# Patient Record
Sex: MALE | Race: WHITE | Employment: FULL TIME | URBAN - METROPOLITAN AREA
[De-identification: names, ages, dates, MRNs, and addresses within clinical notes are randomized per-mention and may not be internally consistent; named-entity substitution may affect disease eponyms.]

---

## 2018-12-25 ENCOUNTER — HOSPITAL ENCOUNTER (EMERGENCY)
Age: 43
Discharge: HOME | End: 2018-12-25
Attending: EMERGENCY MEDICINE
Payer: COMMERCIAL

## 2018-12-25 ENCOUNTER — APPOINTMENT (OUTPATIENT)
Dept: GENERAL RADIOLOGY | Age: 43
End: 2018-12-25
Payer: COMMERCIAL

## 2018-12-25 VITALS
DIASTOLIC BLOOD PRESSURE: 104 MMHG | WEIGHT: 195 LBS | OXYGEN SATURATION: 97 % | SYSTOLIC BLOOD PRESSURE: 142 MMHG | RESPIRATION RATE: 16 BRPM | BODY MASS INDEX: 31.34 KG/M2 | TEMPERATURE: 98.6 F | HEART RATE: 93 BPM | HEIGHT: 66 IN

## 2018-12-25 DIAGNOSIS — S61.210A LACERATION OF RIGHT INDEX FINGER WITHOUT FOREIGN BODY WITHOUT DAMAGE TO NAIL, INITIAL ENCOUNTER: Primary | ICD-10-CM

## 2018-12-25 PROCEDURE — 99283 EMERGENCY DEPT VISIT LOW MDM: CPT

## 2018-12-25 PROCEDURE — 73140 X-RAY EXAM OF FINGER(S): CPT

## 2018-12-25 PROCEDURE — 4500000023 HC ED LEVEL 3 PROCEDURE

## 2018-12-25 ASSESSMENT — PAIN SCALES - GENERAL: PAINLEVEL_OUTOF10: 1

## 2018-12-25 ASSESSMENT — PAIN DESCRIPTION - PAIN TYPE: TYPE: ACUTE PAIN

## 2018-12-25 ASSESSMENT — PAIN DESCRIPTION - LOCATION: LOCATION: FINGER (COMMENT WHICH ONE)

## 2018-12-25 ASSESSMENT — PAIN DESCRIPTION - ORIENTATION: ORIENTATION: RIGHT

## 2019-04-15 ENCOUNTER — OFFICE VISIT (OUTPATIENT)
Dept: CARDIOLOGY CLINIC | Age: 44
End: 2019-04-15
Payer: COMMERCIAL

## 2019-04-15 VITALS
OXYGEN SATURATION: 98 % | SYSTOLIC BLOOD PRESSURE: 122 MMHG | BODY MASS INDEX: 33.43 KG/M2 | HEIGHT: 66 IN | DIASTOLIC BLOOD PRESSURE: 86 MMHG | HEART RATE: 79 BPM | WEIGHT: 208 LBS

## 2019-04-15 DIAGNOSIS — R94.31 ABNORMAL EKG: Primary | ICD-10-CM

## 2019-04-15 DIAGNOSIS — R07.2 PRECORDIAL PAIN: ICD-10-CM

## 2019-04-15 PROCEDURE — 99244 OFF/OP CNSLTJ NEW/EST MOD 40: CPT | Performed by: INTERNAL MEDICINE

## 2019-04-15 PROCEDURE — 93000 ELECTROCARDIOGRAM COMPLETE: CPT | Performed by: INTERNAL MEDICINE

## 2019-04-15 NOTE — PROGRESS NOTES
[facility]   Cardiac Consultation    Referring Provider:  Anny Mantilla MD     Chief Complaint   Patient presents with    New Patient    Other     Abnormal EKG, no symptoms. History of Present Illness:  NEW patient for interventional cardiology, referred by PCP for abnormal EKG. Today he states he has chest pain at times that started 2 years ago. His pain is sometimes resolved when he takes tums. He last had this pain 2 weeks ago. Chest pain does not radiate and he has no associated SOB. Unchanged past 2 years. Patient currently denies any weight gain, edema, palpitations, shortness of breath, dizziness, and syncope. Past Medical History:   has no past medical history on file. Surgical History:   has a past surgical history that includes Shoulder arthroscopy (Bilateral). Social History:   reports that he has never smoked. His smokeless tobacco use includes chew. He reports that he does not drink alcohol or use drugs. Family History:  family history is not on file. No family HX of heart disease     Home Medications:  Prior to Admission medications    Not on File        Allergies:  Patient has no known allergies. Review of Systems:   · Constitutional: there has been no unanticipated weight loss. There's been no change in energy level, sleep pattern, or activity level. · Eyes: No visual changes or diplopia. No scleral icterus. · ENT: No Headaches, hearing loss or vertigo. No mouth sores or sore throat. · Cardiovascular: Reviewed in HPI  · Respiratory: No cough or wheezing, no sputum production. No hematemesis. · Gastrointestinal: No abdominal pain, appetite loss, blood in stools. No change in bowel or bladder habits. · Genitourinary: No dysuria, trouble voiding, or hematuria. · Musculoskeletal:  No gait disturbance, weakness or joint complaints. · Integumentary: No rash or pruritis.   · Neurological: No headache, diplopia, change in muscle strength, numbness or tingling. No change in gait, balance, coordination, mood, affect, memory, mentation, behavior. · Psychiatric: No anxiety, no depression. · Endocrine: No malaise, fatigue or temperature intolerance. No excessive thirst, fluid intake, or urination. No tremor. · Hematologic/Lymphatic: No abnormal bruising or bleeding, blood clots or swollen lymph nodes. · Allergic/Immunologic: No nasal congestion or hives. Physical Examination:    Vitals:    04/15/19 1512   BP: 122/86   Pulse: 79   SpO2: 98%        Constitutional and General Appearance: NAD   Respiratory:  · Normal excursion and expansion without use of accessory muscles  · Resp Auscultation: Normal breath sounds without dullness  Cardiovascular:  · The apical impulses not displaced  · Heart tones are crisp and normal  · Cervical veins are not engorged  · The carotid upstroke is normal in amplitude and contour without delay or bruit  · Normal S1S2, No S3, No Murmur  · Peripheral pulses are symmetrical and full  · There is no clubbing, cyanosis of the extremities. · No edema  · Femoral Arteries: 2+ and equal  · Pedal Pulses: 2+ and equal   Abdomen:  · No masses or tenderness  · Liver/Spleen: No Abnormalities Noted  Neurological/Psychiatric:  · Alert and oriented in all spheres  · Moves all extremities well  · Exhibits normal gait balance and coordination  · No abnormalities of mood, affect, memory, mentation, or behavior are noted    Stress test 2016  Interpetation Summary: 1. Negative ECG for ischemia with graded exercise test.   2.Govea Treadmill Score is 9 which indicates low risk. Assessment:   Abnormal EKG - ST depression. possible old IMI   Chest pain - atypical    Plan:  Stress test- Clarissa Myoview  Echocardiogram   Continue current medications   Check blood pressure at home weekly  Regular exercise and following a healthy diet encouraged   Follow up with me in 2 weeks     Scribe's attestation:   This note was scribed in the presence of Dr. Eulogio Lu Brook FUCHS By Anahy Zamudio RN    The scribes docuementation has been prepared under my direction and personally reviewed by me in its entirety. I confirm that the note above accurately reflects all work, treatment, procedures, and medical decision making performed by me. Dr. Beka Jett MD        Thank you for allowing me to participate in the care of this individual.      Darian Conroy.  Chinmay Bowens M.D., Marino Quintanilla

## 2019-04-15 NOTE — PATIENT INSTRUCTIONS
Plan:  Stress test- Clarissa Myoernesto  Echocardiogram   Continue current medications   Check blood pressure at home weekly  Regular exercise and following a healthy diet encouraged   Follow up with me in 2 weeks

## 2019-04-15 NOTE — LETTER
[address] Cardiology - [address] [place]  Phone: [phone]  Fax: [phone]    Ping President, MD        April 23, 2019     Sophia Albarran, [address] [facility] [place]    Patient: Juliette Viramontes  MR Number: [id number]  YOB: 1975  Date of Visit: 4/15/2019    Dear Dr. Cortes Files:    Today I saw our mutual patient named above. Below are the relevant portions of my assessment and plan of care. If you have questions, please do not hesitate to call me. I look forward to following [facility] along with you. [address]   Cardiac Consultation    Referring Provider:  Sophia Albarran MD     Chief Complaint   Patient presents with    New Patient    Other     Abnormal EKG, no symptoms. History of Present Illness:  NEW patient for interventional cardiology, referred by PCP for abnormal EKG. Today he states he has chest pain at times that started 2 years ago. His pain is sometimes resolved when he takes tums. He last had this pain 2 weeks ago. Chest pain does not radiate and he has no associated SOB. Unchanged past 2 years. Patient currently denies any weight gain, edema, palpitations, shortness of breath, dizziness, and syncope. Past Medical History:   has no past medical history on file. Surgical History:   has a past surgical history that includes Shoulder arthroscopy (Bilateral). Social History:   reports that he has never smoked. His smokeless tobacco use includes chew. He reports that he does not drink alcohol or use drugs. Family History:  family history is not on file. No family HX of heart disease     Home Medications:  Prior to Admission medications    Not on File        Allergies:  Patient has no known allergies. Review of Systems:   · Constitutional: there has been no unanticipated weight loss. There's been no change in energy level, sleep pattern, or activity level. · Eyes: No visual changes or diplopia. No scleral icterus. · ENT: No Headaches, hearing loss or vertigo. No mouth sores or sore throat. · Cardiovascular: Reviewed in HPI  · Respiratory: No cough or wheezing, no sputum production. No hematemesis. · Gastrointestinal: No abdominal pain, appetite loss, blood in stools. No change in bowel or bladder habits. · Genitourinary: No dysuria, trouble voiding, or hematuria. · Musculoskeletal:  No gait disturbance, weakness or joint complaints. · Integumentary: No rash or pruritis. · Neurological: No headache, diplopia, change in muscle strength, numbness or tingling. No change in gait, balance, coordination, mood, affect, memory, mentation, behavior. · Psychiatric: No anxiety, no depression. · Endocrine: No malaise, fatigue or temperature intolerance. No excessive thirst, fluid intake, or urination. No tremor. · Hematologic/Lymphatic: No abnormal bruising or bleeding, blood clots or swollen lymph nodes. · Allergic/Immunologic: No nasal congestion or hives. Physical Examination:    Vitals:    04/15/19 1512   BP: 122/86   Pulse: 79   SpO2: 98%        Constitutional and General Appearance: NAD   Respiratory:  · Normal excursion and expansion without use of accessory muscles  · Resp Auscultation: Normal breath sounds without dullness  Cardiovascular:  · The apical impulses not displaced  · Heart tones are crisp and normal  · Cervical veins are not engorged  · The carotid upstroke is normal in amplitude and contour without delay or bruit  · Normal S1S2, No S3, No Murmur  · Peripheral pulses are symmetrical and full  · There is no clubbing, cyanosis of the extremities.   · No edema  · Femoral Arteries: 2+ and equal  · Pedal Pulses: 2+ and equal   Abdomen:  · No masses or tenderness  · Liver/Spleen: No Abnormalities Noted  Neurological/Psychiatric:  · Alert and oriented in all spheres  · Moves all extremities well  · Exhibits normal gait balance and coordination · No abnormalities of mood, affect, memory, mentation, or behavior are noted    Stress test 2016  Interpetation Summary: 1. Negative ECG for ischemia with graded exercise test.   2.Govea Treadmill Score is 9 which indicates low risk. Assessment:   Abnormal EKG - ST depression. possible old IMI   Chest pain - atypical    Plan:  Stress test- Clarissa Myoview  Echocardiogram   Continue current medications   Check blood pressure at home weekly  Regular exercise and following a healthy diet encouraged   Follow up with me in 2 weeks     Scribe's attestation: This note was scribed in the presence of Dr. Ally Simeon M.D. By Georgina Cifuentes RN    The scribes docuementation has been prepared under my direction and personally reviewed by me in its entirety. I confirm that the note above accurately reflects all work, treatment, procedures, and medical decision making performed by me. Dr. Ally Simeon MD        Thank you for allowing me to participate in the care of this individual.      Sabine Mcdonald.  Sarah Herrera M.D., [place]

## 2019-04-19 ENCOUNTER — HOSPITAL ENCOUNTER (OUTPATIENT)
Dept: CARDIOLOGY | Age: 44
Discharge: HOME | End: 2019-04-19
Payer: COMMERCIAL

## 2019-04-19 ENCOUNTER — APPOINTMENT (OUTPATIENT)
Dept: NON INVASIVE DIAGNOSTICS | Age: 44
End: 2019-04-19
Payer: COMMERCIAL

## 2019-04-19 ENCOUNTER — TELEPHONE (OUTPATIENT)
Dept: CARDIOLOGY CLINIC | Age: 44
End: 2019-04-19

## 2019-04-19 DIAGNOSIS — R07.2 PRECORDIAL PAIN: ICD-10-CM

## 2019-04-19 DIAGNOSIS — R94.31 ABNORMAL EKG: ICD-10-CM

## 2019-04-19 LAB
LV EF: 58 %
LV EF: 61 %
LVEF MODALITY: NORMAL
LVEF MODALITY: NORMAL

## 2019-04-19 PROCEDURE — 93306 TTE W/DOPPLER COMPLETE: CPT

## 2019-04-19 PROCEDURE — A9502 TC99M TETROFOSMIN: HCPCS | Performed by: INTERNAL MEDICINE

## 2019-04-19 PROCEDURE — 93017 CV STRESS TEST TRACING ONLY: CPT

## 2019-04-19 PROCEDURE — 78452 HT MUSCLE IMAGE SPECT MULT: CPT

## 2019-04-19 PROCEDURE — 3430000000 HC RX DIAGNOSTIC RADIOPHARMACEUTICAL: Performed by: INTERNAL MEDICINE

## 2019-04-19 RX ADMIN — TETROFOSMIN 31.2 MILLICURIE: 0.23 INJECTION, POWDER, LYOPHILIZED, FOR SOLUTION INTRAVENOUS at 09:30

## 2019-04-19 RX ADMIN — TETROFOSMIN 11.2 MILLICURIE: 0.23 INJECTION, POWDER, LYOPHILIZED, FOR SOLUTION INTRAVENOUS at 08:05

## 2019-04-19 NOTE — TELEPHONE ENCOUNTER
----- Message from Gibran Casanova MD sent at 4/19/2019  3:51 PM EDT -----  Call  Echo [facility]

## 2019-04-19 NOTE — LETTER
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      Sincerely,        Kevin Contreras MD

## 2019-04-19 NOTE — TELEPHONE ENCOUNTER
Created telephone encounter. Spoke with Rhina Knott relayed message per [address] regarding echo. Pt verbalized understanding. Pt would like to know if he needs to schedule the 2 week follow up appt?

## 2019-04-22 NOTE — TELEPHONE ENCOUNTER
I called patient back and let him know that he can follow up with his PCP. 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

## 2020-06-09 ENCOUNTER — APPOINTMENT (OUTPATIENT)
Dept: CT IMAGING | Age: 45
DRG: 392 | End: 2020-06-09
Payer: COMMERCIAL

## 2020-06-09 ENCOUNTER — HOSPITAL ENCOUNTER (INPATIENT)
Age: 45
LOS: 3 days | Discharge: HOME | DRG: 392 | End: 2020-06-12
Attending: SURGERY | Admitting: SURGERY
Payer: COMMERCIAL

## 2020-06-09 PROBLEM — K57.32 DIVERTICULITIS OF SIGMOID COLON: Status: ACTIVE | Noted: 2020-06-09

## 2020-06-09 LAB
A/G RATIO: 1.4 (ref 1.1–2.2)
ALBUMIN SERPL-MCNC: 4.4 G/DL (ref 3.4–5)
ALP BLD-CCNC: 62 U/L (ref 40–129)
ALT SERPL-CCNC: 18 U/L (ref 10–40)
ANION GAP SERPL CALCULATED.3IONS-SCNC: 8 MMOL/L (ref 3–16)
AST SERPL-CCNC: 15 U/L (ref 15–37)
BACTERIA: ABNORMAL /HPF
BASOPHILS ABSOLUTE: 0.1 K/UL (ref 0–0.2)
BASOPHILS RELATIVE PERCENT: 0.6 %
BILIRUB SERPL-MCNC: 0.8 MG/DL (ref 0–1)
BILIRUBIN URINE: NEGATIVE
BLOOD, URINE: ABNORMAL
BUN BLDV-MCNC: 14 MG/DL (ref 7–20)
CALCIUM SERPL-MCNC: 9.4 MG/DL (ref 8.3–10.6)
CHLORIDE BLD-SCNC: 100 MMOL/L (ref 99–110)
CLARITY: CLEAR
CO2: 28 MMOL/L (ref 21–32)
COLOR: YELLOW
CREAT SERPL-MCNC: 0.9 MG/DL (ref 0.9–1.3)
EOSINOPHILS ABSOLUTE: 0.3 K/UL (ref 0–0.6)
EOSINOPHILS RELATIVE PERCENT: 1.9 %
EPITHELIAL CELLS, UA: ABNORMAL /HPF (ref 0–5)
GFR AFRICAN AMERICAN: >60
GFR NON-AFRICAN AMERICAN: >60
GLOBULIN: 3.1 G/DL
GLUCOSE BLD-MCNC: 102 MG/DL (ref 70–99)
GLUCOSE URINE: NEGATIVE MG/DL
HCT VFR BLD CALC: 45.5 % (ref 40.5–52.5)
HEMOGLOBIN: 15.6 G/DL (ref 13.5–17.5)
KETONES, URINE: NEGATIVE MG/DL
LEUKOCYTE ESTERASE, URINE: NEGATIVE
LIPASE: 18 U/L (ref 13–60)
LYMPHOCYTES ABSOLUTE: 1.7 K/UL (ref 1–5.1)
LYMPHOCYTES RELATIVE PERCENT: 11.9 %
MCH RBC QN AUTO: 30.6 PG (ref 26–34)
MCHC RBC AUTO-ENTMCNC: 34.4 G/DL (ref 31–36)
MCV RBC AUTO: 89.1 FL (ref 80–100)
MICROSCOPIC EXAMINATION: YES
MONOCYTES ABSOLUTE: 1.5 K/UL (ref 0–1.3)
MONOCYTES RELATIVE PERCENT: 10.9 %
MUCUS: ABNORMAL /LPF
NEUTROPHILS ABSOLUTE: 10.4 K/UL (ref 1.7–7.7)
NEUTROPHILS RELATIVE PERCENT: 74.7 %
NITRITE, URINE: NEGATIVE
PDW BLD-RTO: 13.2 % (ref 12.4–15.4)
PH UA: 6 (ref 5–8)
PLATELET # BLD: 215 K/UL (ref 135–450)
PMV BLD AUTO: 7.2 FL (ref 5–10.5)
POTASSIUM SERPL-SCNC: 4.1 MMOL/L (ref 3.5–5.1)
PROTEIN UA: NEGATIVE MG/DL
RBC # BLD: 5.1 M/UL (ref 4.2–5.9)
RBC UA: ABNORMAL /HPF (ref 0–4)
SODIUM BLD-SCNC: 136 MMOL/L (ref 136–145)
SPECIFIC GRAVITY UA: 1.01 (ref 1–1.03)
TOTAL PROTEIN: 7.5 G/DL (ref 6.4–8.2)
URINE TYPE: ABNORMAL
UROBILINOGEN, URINE: 0.2 E.U./DL
WBC # BLD: 13.9 K/UL (ref 4–11)
WBC UA: ABNORMAL /HPF (ref 0–5)

## 2020-06-09 PROCEDURE — 96365 THER/PROPH/DIAG IV INF INIT: CPT

## 2020-06-09 PROCEDURE — 80053 COMPREHEN METABOLIC PANEL: CPT

## 2020-06-09 PROCEDURE — 83690 ASSAY OF LIPASE: CPT

## 2020-06-09 PROCEDURE — 99285 EMERGENCY DEPT VISIT HI MDM: CPT

## 2020-06-09 PROCEDURE — 85025 COMPLETE CBC W/AUTO DIFF WBC: CPT

## 2020-06-09 PROCEDURE — 2580000003 HC RX 258: Performed by: SURGERY

## 2020-06-09 PROCEDURE — 81001 URINALYSIS AUTO W/SCOPE: CPT

## 2020-06-09 PROCEDURE — 2580000003 HC RX 258: Performed by: NURSE PRACTITIONER

## 2020-06-09 PROCEDURE — 6360000004 HC RX CONTRAST MEDICATION: Performed by: NURSE PRACTITIONER

## 2020-06-09 PROCEDURE — 1200000000 HC SEMI PRIVATE

## 2020-06-09 PROCEDURE — 6370000000 HC RX 637 (ALT 250 FOR IP): Performed by: SURGERY

## 2020-06-09 PROCEDURE — 6360000002 HC RX W HCPCS: Performed by: NURSE PRACTITIONER

## 2020-06-09 PROCEDURE — 74177 CT ABD & PELVIS W/CONTRAST: CPT

## 2020-06-09 RX ORDER — MORPHINE SULFATE 4 MG/ML
4 INJECTION, SOLUTION INTRAMUSCULAR; INTRAVENOUS
Status: DISCONTINUED | OUTPATIENT
Start: 2020-06-09 | End: 2020-06-12 | Stop reason: HOSPADM

## 2020-06-09 RX ORDER — 0.9 % SODIUM CHLORIDE 0.9 %
1000 INTRAVENOUS SOLUTION INTRAVENOUS ONCE
Status: COMPLETED | OUTPATIENT
Start: 2020-06-09 | End: 2020-06-09

## 2020-06-09 RX ORDER — MORPHINE SULFATE 2 MG/ML
2 INJECTION, SOLUTION INTRAMUSCULAR; INTRAVENOUS
Status: DISCONTINUED | OUTPATIENT
Start: 2020-06-09 | End: 2020-06-12 | Stop reason: HOSPADM

## 2020-06-09 RX ORDER — SODIUM CHLORIDE 9 MG/ML
INJECTION, SOLUTION INTRAVENOUS CONTINUOUS
Status: DISCONTINUED | OUTPATIENT
Start: 2020-06-09 | End: 2020-06-12 | Stop reason: HOSPADM

## 2020-06-09 RX ORDER — ONDANSETRON 2 MG/ML
4 INJECTION INTRAMUSCULAR; INTRAVENOUS EVERY 30 MIN PRN
Status: DISCONTINUED | OUTPATIENT
Start: 2020-06-09 | End: 2020-06-09

## 2020-06-09 RX ORDER — ONDANSETRON 2 MG/ML
4 INJECTION INTRAMUSCULAR; INTRAVENOUS EVERY 6 HOURS PRN
Status: DISCONTINUED | OUTPATIENT
Start: 2020-06-09 | End: 2020-06-12 | Stop reason: HOSPADM

## 2020-06-09 RX ORDER — MORPHINE SULFATE 4 MG/ML
4 INJECTION, SOLUTION INTRAMUSCULAR; INTRAVENOUS EVERY 30 MIN PRN
Status: DISCONTINUED | OUTPATIENT
Start: 2020-06-09 | End: 2020-06-09

## 2020-06-09 RX ORDER — FAMOTIDINE 20 MG/1
20 TABLET, FILM COATED ORAL 2 TIMES DAILY
Status: DISCONTINUED | OUTPATIENT
Start: 2020-06-09 | End: 2020-06-12 | Stop reason: HOSPADM

## 2020-06-09 RX ORDER — SODIUM CHLORIDE 0.9 % (FLUSH) 0.9 %
10 SYRINGE (ML) INJECTION PRN
Status: DISCONTINUED | OUTPATIENT
Start: 2020-06-09 | End: 2020-06-12 | Stop reason: HOSPADM

## 2020-06-09 RX ORDER — SODIUM CHLORIDE 0.9 % (FLUSH) 0.9 %
10 SYRINGE (ML) INJECTION EVERY 12 HOURS SCHEDULED
Status: DISCONTINUED | OUTPATIENT
Start: 2020-06-09 | End: 2020-06-12 | Stop reason: HOSPADM

## 2020-06-09 RX ORDER — ACETAMINOPHEN 325 MG/1
650 TABLET ORAL EVERY 4 HOURS PRN
Status: DISCONTINUED | OUTPATIENT
Start: 2020-06-09 | End: 2020-06-12 | Stop reason: HOSPADM

## 2020-06-09 RX ADMIN — SODIUM CHLORIDE 1000 ML: 9 INJECTION, SOLUTION INTRAVENOUS at 18:55

## 2020-06-09 RX ADMIN — FAMOTIDINE 20 MG: 20 TABLET, FILM COATED ORAL at 23:08

## 2020-06-09 RX ADMIN — Medication 10 ML: at 23:08

## 2020-06-09 RX ADMIN — PIPERACILLIN AND TAZOBACTAM 3.38 G: 3; .375 INJECTION, POWDER, LYOPHILIZED, FOR SOLUTION INTRAVENOUS at 20:42

## 2020-06-09 RX ADMIN — SODIUM CHLORIDE: 9 INJECTION, SOLUTION INTRAVENOUS at 23:08

## 2020-06-09 RX ADMIN — ACETAMINOPHEN 650 MG: 325 TABLET ORAL at 23:08

## 2020-06-09 RX ADMIN — IOPAMIDOL 75 ML: 755 INJECTION, SOLUTION INTRAVENOUS at 19:33

## 2020-06-09 ASSESSMENT — PAIN SCALES - GENERAL
PAINLEVEL_OUTOF10: 8
PAINLEVEL_OUTOF10: 6
PAINLEVEL_OUTOF10: 4
PAINLEVEL_OUTOF10: 5

## 2020-06-09 ASSESSMENT — PAIN DESCRIPTION - LOCATION
LOCATION: ABDOMEN
LOCATION: ABDOMEN;HEAD

## 2020-06-09 ASSESSMENT — PAIN DESCRIPTION - PAIN TYPE
TYPE: ACUTE PAIN
TYPE: ACUTE PAIN

## 2020-06-09 ASSESSMENT — PAIN DESCRIPTION - ORIENTATION: ORIENTATION: MID

## 2020-06-09 ASSESSMENT — PAIN DESCRIPTION - DESCRIPTORS: DESCRIPTORS: CRAMPING;HEADACHE

## 2020-06-09 ASSESSMENT — PAIN DESCRIPTION - PROGRESSION: CLINICAL_PROGRESSION: GRADUALLY WORSENING

## 2020-06-09 ASSESSMENT — PAIN - FUNCTIONAL ASSESSMENT: PAIN_FUNCTIONAL_ASSESSMENT: ACTIVITIES ARE NOT PREVENTED

## 2020-06-09 NOTE — LETTER
[facility]  [address] [address]  Phone: [phone]          June 12, 2020     Patient: Luis Angel Chambers   YOB: 1975   Date of Visit: 6/9/2020       To Whom It May Concern: It is my medical opinion that Maxine Rivera be off of work from 6/9/20 until 6/17/20 for a recent illness. .    If you have any questions or concerns, please don't hesitate to call. Sincerely,        No name on file.

## 2020-06-09 NOTE — ED PROVIDER NOTES
Evaluated by [id number] [facility] Provider    [facility]  ED    CHIEF COMPLAINT  Abdominal Pain (abd pain since sat night, nausea, diarrhea with fevers last night ) and Fever    HISTORY OF PRESENT ILLNESS  Luis Angel Chambers is a 38 y.o. male who presents to the ED complaining of abdominal pain and cramping. Started Saturday night, sharp pain lower abdomen at the belt line in the middle. Does have bloating to the abdomen. Does report fever yesterday which was 100 degrees, spiked to 103 degrees. At 5 am fever broke, gone today. Took advil for fever. Diarrhea since Saturday night, reports nausea, no vomiting. Can urinate fine, no pain. Denies CP, SOB, cough. Denies URI symptoms. No prior abdominal surgeries. The patient is currently rating their pain as 6/10 and describes it as an sharp type of pain. Treatments tried prior to arrival in the ED: above. The patient denies other complaints, modifying factors or associated symptoms. The patient arrived to the ED via private car. PAST MEDICAL HISTORY    History reviewed. No pertinent past medical history. SURGICAL HISTORY    Past Surgical History:   Procedure Laterality Date    SHOULDER ARTHROSCOPY Bilateral        CURRENT MEDICATIONS    None    ALLERGIES    No Known Allergies    FAMILY HISTORY    History reviewed. No pertinent family history.     SOCIAL HISTORY    Social History     Socioeconomic History    Marital status:      Spouse name: None    Number of children: None    Years of education: None    Highest education level: None   Occupational History    None   Social Needs    Financial resource strain: None    Food insecurity     Worry: None     Inability: None    Transportation needs     Medical: None     Non-medical: None   Tobacco Use    Smoking status: Never Smoker    Smokeless tobacco: Current User     Types: Chew   Substance and Sexual Activity    Alcohol use: No    Drug use: No    Sexual activity: None deficits.  Gait was observed and is normal.    LABS   Results for orders placed or performed during the hospital encounter of 06/09/20   CBC Auto Differential   Result Value Ref Range    WBC 13.9 (H) 4.0 - 11.0 K/uL    RBC 5.10 4.20 - 5.90 M/uL    Hemoglobin 15.6 13.5 - 17.5 g/dL    Hematocrit 45.5 40.5 - 52.5 %    MCV 89.1 80.0 - 100.0 fL    MCH 30.6 26.0 - 34.0 pg    MCHC 34.4 31.0 - 36.0 g/dL    RDW 13.2 12.4 - 15.4 %    Platelets 686 270 - 218 K/uL    MPV 7.2 5.0 - 10.5 fL    Neutrophils % 74.7 %    Lymphocytes % 11.9 %    Monocytes % 10.9 %    Eosinophils % 1.9 %    Basophils % 0.6 %    Neutrophils Absolute 10.4 (H) 1.7 - 7.7 K/uL    Lymphocytes Absolute 1.7 1.0 - 5.1 K/uL    Monocytes Absolute 1.5 (H) 0.0 - 1.3 K/uL    Eosinophils Absolute 0.3 0.0 - 0.6 K/uL    Basophils Absolute 0.1 0.0 - 0.2 K/uL   Comprehensive Metabolic Panel   Result Value Ref Range    Sodium 136 136 - 145 mmol/L    Potassium 4.1 3.5 - 5.1 mmol/L    Chloride 100 99 - 110 mmol/L    CO2 28 21 - 32 mmol/L    Anion Gap 8 3 - 16    Glucose 102 (H) 70 - 99 mg/dL    BUN 14 7 - 20 mg/dL    CREATININE 0.9 0.9 - 1.3 mg/dL    GFR Non-African American >60 >60    GFR African American >60 >60    Calcium 9.4 8.3 - 10.6 mg/dL    Total Protein 7.5 6.4 - 8.2 g/dL    Alb 4.4 3.4 - 5.0 g/dL    Albumin/Globulin Ratio 1.4 1.1 - 2.2    Total Bilirubin 0.8 0.0 - 1.0 mg/dL    Alkaline Phosphatase 62 40 - 129 U/L    ALT 18 10 - 40 U/L    AST 15 15 - 37 U/L    Globulin 3.1 g/dL   Lipase   Result Value Ref Range    Lipase 18.0 13.0 - 60.0 U/L   Urinalysis   Result Value Ref Range    Color, UA Yellow Straw/Yellow    Clarity, UA Clear Clear    Glucose, Ur Negative Negative mg/dL    Bilirubin Urine Negative Negative    Ketones, Urine Negative Negative mg/dL    Specific Gravity, UA 1.015 1.005 - 1.030    Blood, Urine TRACE-INTACT (A) Negative    pH, UA 6.0 5.0 - 8.0    Protein, UA Negative Negative mg/dL    Urobilinogen, Urine 0.2 <2.0 E.U./dL    Nitrite, Urine Negative mesenteric fat near the diseased sigmoid colon that are associated with an underlying microperforation without evidence of joseline intraperitoneal free air. No focal fluid collection within the abdomen or pelvis to suggest presence of abscess. I did consult general surgery and spoke with Dr. Ginny Crow who advised that he feels the patient would benefit from being admitted to the hospital for some IV antibiotics. He did recommend that I start Zosyn down here in the ED. Pt was given the following medications or treatments in the ED:   Medications   morphine (PF) injection 4 mg (has no administration in time range)   ondansetron (ZOFRAN) injection 4 mg (has no administration in time range)   piperacillin-tazobactam (ZOSYN) 3.375 g in dextrose 5 % 50 mL IVPB (mini-bag) (3.375 g Intravenous New Bag 6/9/20 2042)   0.9 % sodium chloride bolus (0 mLs Intravenous Stopped 6/9/20 2041)   iopamidol (ISOVUE-370) 76 % injection 75 mL (75 mLs Intravenous Given 6/9/20 1933)     I spoke with Dr. Ginny Crow. We thoroughly discussed the history, physical exam, laboratory and imaging studies, as well as, emergency department course. Based upon that discussion, we've decided to admit Kelsey Lozano for further observation and evaluation of Viry Ag's abdominal pain. As I have deemed necessary from their history, physical and studies, I have considered and evaluated Kelsey Lozano for the following diagnoses:  ACUTE APPENDICITIS, BOWEL OBSTRUCTION, CHOLECYSTITIS, DIVERTICULITIS, INCARCERATED HERNIA, PANCREATITIS, or PERFORATED BOWEL or ULCER. CLINICAL IMPRESSION    1. Perforation of sigmoid colon due to diverticulitis    2. Lower abdominal pain    3. Leukocytosis, unspecified type       DISPOSITION  Admitted.     Comment: Pleasenote this report has been produced using speech recognition software and may contain errors related to that system including errors in grammar, punctuation, and spelling, as well as words and

## 2020-06-10 PROBLEM — K57.20 PERFORATION OF SIGMOID COLON DUE TO DIVERTICULITIS: Status: ACTIVE | Noted: 2020-06-09

## 2020-06-10 LAB
ANION GAP SERPL CALCULATED.3IONS-SCNC: 7 MMOL/L (ref 3–16)
BASOPHILS ABSOLUTE: 0.1 K/UL (ref 0–0.2)
BASOPHILS RELATIVE PERCENT: 0.7 %
BUN BLDV-MCNC: 10 MG/DL (ref 7–20)
CALCIUM SERPL-MCNC: 8.6 MG/DL (ref 8.3–10.6)
CHLORIDE BLD-SCNC: 105 MMOL/L (ref 99–110)
CO2: 25 MMOL/L (ref 21–32)
CREAT SERPL-MCNC: 0.8 MG/DL (ref 0.9–1.3)
EOSINOPHILS ABSOLUTE: 0.2 K/UL (ref 0–0.6)
EOSINOPHILS RELATIVE PERCENT: 1.8 %
GFR AFRICAN AMERICAN: >60
GFR NON-AFRICAN AMERICAN: >60
GLUCOSE BLD-MCNC: 99 MG/DL (ref 70–99)
HCT VFR BLD CALC: 41.3 % (ref 40.5–52.5)
HEMOGLOBIN: 14 G/DL (ref 13.5–17.5)
LYMPHOCYTES ABSOLUTE: 1.3 K/UL (ref 1–5.1)
LYMPHOCYTES RELATIVE PERCENT: 11.2 %
MCH RBC QN AUTO: 30.2 PG (ref 26–34)
MCHC RBC AUTO-ENTMCNC: 33.8 G/DL (ref 31–36)
MCV RBC AUTO: 89.4 FL (ref 80–100)
MONOCYTES ABSOLUTE: 1.3 K/UL (ref 0–1.3)
MONOCYTES RELATIVE PERCENT: 11 %
NEUTROPHILS ABSOLUTE: 8.6 K/UL (ref 1.7–7.7)
NEUTROPHILS RELATIVE PERCENT: 75.3 %
PDW BLD-RTO: 13.4 % (ref 12.4–15.4)
PLATELET # BLD: 186 K/UL (ref 135–450)
PMV BLD AUTO: 7.3 FL (ref 5–10.5)
POTASSIUM REFLEX MAGNESIUM: 4 MMOL/L (ref 3.5–5.1)
RBC # BLD: 4.63 M/UL (ref 4.2–5.9)
SODIUM BLD-SCNC: 137 MMOL/L (ref 136–145)
WBC # BLD: 11.5 K/UL (ref 4–11)

## 2020-06-10 PROCEDURE — 1200000000 HC SEMI PRIVATE

## 2020-06-10 PROCEDURE — 2580000003 HC RX 258: Performed by: SURGERY

## 2020-06-10 PROCEDURE — 6370000000 HC RX 637 (ALT 250 FOR IP): Performed by: SURGERY

## 2020-06-10 PROCEDURE — 80048 BASIC METABOLIC PNL TOTAL CA: CPT

## 2020-06-10 PROCEDURE — 6360000002 HC RX W HCPCS: Performed by: SURGERY

## 2020-06-10 PROCEDURE — 99222 1ST HOSP IP/OBS MODERATE 55: CPT | Performed by: SURGERY

## 2020-06-10 PROCEDURE — 85025 COMPLETE CBC W/AUTO DIFF WBC: CPT

## 2020-06-10 RX ADMIN — ENOXAPARIN SODIUM 40 MG: 40 INJECTION SUBCUTANEOUS at 09:38

## 2020-06-10 RX ADMIN — PIPERACILLIN AND TAZOBACTAM 3.38 G: 3; .375 INJECTION, POWDER, LYOPHILIZED, FOR SOLUTION INTRAVENOUS at 06:10

## 2020-06-10 RX ADMIN — FAMOTIDINE 20 MG: 20 TABLET, FILM COATED ORAL at 21:22

## 2020-06-10 RX ADMIN — PIPERACILLIN AND TAZOBACTAM 3.38 G: 3; .375 INJECTION, POWDER, LYOPHILIZED, FOR SOLUTION INTRAVENOUS at 14:53

## 2020-06-10 RX ADMIN — FAMOTIDINE 20 MG: 20 TABLET, FILM COATED ORAL at 09:24

## 2020-06-10 RX ADMIN — SODIUM CHLORIDE: 9 INJECTION, SOLUTION INTRAVENOUS at 06:10

## 2020-06-10 RX ADMIN — Medication 10 ML: at 10:19

## 2020-06-10 RX ADMIN — ACETAMINOPHEN 650 MG: 325 TABLET ORAL at 09:24

## 2020-06-10 RX ADMIN — SODIUM CHLORIDE: 9 INJECTION, SOLUTION INTRAVENOUS at 21:31

## 2020-06-10 RX ADMIN — PIPERACILLIN AND TAZOBACTAM 3.38 G: 3; .375 INJECTION, POWDER, LYOPHILIZED, FOR SOLUTION INTRAVENOUS at 21:20

## 2020-06-10 ASSESSMENT — PAIN SCALES - GENERAL
PAINLEVEL_OUTOF10: 0
PAINLEVEL_OUTOF10: 3

## 2020-06-10 NOTE — PROGRESS NOTES
Patient resting in bed alert and oriented x4. Assessment completed as documented. Vital signs stable. Patient denies pain unless up out of bed with movement. Patent with c/o headache. Administered prn tylenol. Call light within reach. Will monitor.

## 2020-06-10 NOTE — PLAN OF CARE
Problem: Nutritional:  Goal: Ability to follow a diet with enough fiber (20 to 30 grams) for normal bowel function will improve  Description: Ability to follow a diet with enough fiber (20 to 30 grams) for normal bowel function will improve  Outcome: Ongoing  Note: Kept NPO at the moment for bowel rest. Aware of NPO status and what it means. Allowed to have ice chips, popsicle and sips of water w. Meds.

## 2020-06-11 LAB
BASOPHILS ABSOLUTE: 0.1 K/UL (ref 0–0.2)
BASOPHILS RELATIVE PERCENT: 1 %
EOSINOPHILS ABSOLUTE: 0.3 K/UL (ref 0–0.6)
EOSINOPHILS RELATIVE PERCENT: 4 %
HCT VFR BLD CALC: 41.1 % (ref 40.5–52.5)
HEMOGLOBIN: 14.2 G/DL (ref 13.5–17.5)
LYMPHOCYTES ABSOLUTE: 1.6 K/UL (ref 1–5.1)
LYMPHOCYTES RELATIVE PERCENT: 20.6 %
MCH RBC QN AUTO: 30.8 PG (ref 26–34)
MCHC RBC AUTO-ENTMCNC: 34.6 G/DL (ref 31–36)
MCV RBC AUTO: 89.1 FL (ref 80–100)
MONOCYTES ABSOLUTE: 0.8 K/UL (ref 0–1.3)
MONOCYTES RELATIVE PERCENT: 10.8 %
NEUTROPHILS ABSOLUTE: 4.9 K/UL (ref 1.7–7.7)
NEUTROPHILS RELATIVE PERCENT: 63.6 %
PDW BLD-RTO: 13.2 % (ref 12.4–15.4)
PLATELET # BLD: 219 K/UL (ref 135–450)
PMV BLD AUTO: 7.4 FL (ref 5–10.5)
RBC # BLD: 4.61 M/UL (ref 4.2–5.9)
WBC # BLD: 7.6 K/UL (ref 4–11)

## 2020-06-11 PROCEDURE — 1200000000 HC SEMI PRIVATE

## 2020-06-11 PROCEDURE — APPSS45 APP SPLIT SHARED TIME 31-45 MINUTES: Performed by: CLINICAL NURSE SPECIALIST

## 2020-06-11 PROCEDURE — 36415 COLL VENOUS BLD VENIPUNCTURE: CPT

## 2020-06-11 PROCEDURE — 85025 COMPLETE CBC W/AUTO DIFF WBC: CPT

## 2020-06-11 PROCEDURE — 6360000002 HC RX W HCPCS: Performed by: SURGERY

## 2020-06-11 PROCEDURE — 99232 SBSQ HOSP IP/OBS MODERATE 35: CPT | Performed by: SURGERY

## 2020-06-11 PROCEDURE — 6370000000 HC RX 637 (ALT 250 FOR IP): Performed by: SURGERY

## 2020-06-11 PROCEDURE — 2580000003 HC RX 258: Performed by: SURGERY

## 2020-06-11 RX ADMIN — Medication 10 ML: at 21:19

## 2020-06-11 RX ADMIN — ACETAMINOPHEN 650 MG: 325 TABLET ORAL at 08:50

## 2020-06-11 RX ADMIN — FAMOTIDINE 20 MG: 20 TABLET, FILM COATED ORAL at 08:50

## 2020-06-11 RX ADMIN — PIPERACILLIN AND TAZOBACTAM 3.38 G: 3; .375 INJECTION, POWDER, LYOPHILIZED, FOR SOLUTION INTRAVENOUS at 05:45

## 2020-06-11 RX ADMIN — FAMOTIDINE 20 MG: 20 TABLET, FILM COATED ORAL at 21:19

## 2020-06-11 RX ADMIN — SODIUM CHLORIDE: 9 INJECTION, SOLUTION INTRAVENOUS at 05:45

## 2020-06-11 RX ADMIN — PIPERACILLIN AND TAZOBACTAM 3.38 G: 3; .375 INJECTION, POWDER, LYOPHILIZED, FOR SOLUTION INTRAVENOUS at 14:02

## 2020-06-11 RX ADMIN — PIPERACILLIN AND TAZOBACTAM 3.38 G: 3; .375 INJECTION, POWDER, LYOPHILIZED, FOR SOLUTION INTRAVENOUS at 21:19

## 2020-06-11 ASSESSMENT — PAIN SCALES - GENERAL: PAINLEVEL_OUTOF10: 4

## 2020-06-11 NOTE — PLAN OF CARE
Problem:  Bowel/Gastric:  Goal: Control of bowel function will improve  Description: Control of bowel function will improve  Outcome: Ongoing  Goal: Ability to achieve a regular elimination pattern will improve  Description: Ability to achieve a regular elimination pattern will improve  Outcome: Ongoing     Problem: Nutritional:  Goal: Ability to follow a diet with enough fiber (20 to 30 grams) for normal bowel function will improve  Description: Ability to follow a diet with enough fiber (20 to 30 grams) for normal bowel function will improve  Outcome: Ongoing

## 2020-06-11 NOTE — PROGRESS NOTES
[facility] SURGERY    PATIENT NAME: Angelina Bernstein     TODAY'S DATE: 6/11/2020    CHIEF COMPLAINT: mild abd pain    INTERVAL HISTORY/HPI:    Pt doing well, pain is much improved. denies nausea, having loose stools     REVIEW OF SYSTEMS:  Pertinent positives and negatives as per interval history section    OBJECTIVE:  VITALS:  /86   Pulse 67   Temp 97.3 °F (36.3 °C) (Oral)   Resp 18   Ht 5' 6\" (1.676 m)   Wt 211 lb 14.4 oz (96.1 kg)   SpO2 95%   BMI 34.20 kg/m²     INTAKE/OUTPUT:    I/O last 3 completed shifts: In: 3209 [P.O.:1220; I.V.:1989]  Out: -   I/O this shift:  In: 620 [P.O.:620]  Out: -     CONSTITUTIONAL:  awake and alert  LUNGS:  Respirations easy and unlabored, no crackles or wheezing  CARD:  regular rate and rhythm  ABDOMEN:  normal bowel sounds, soft, non-distended, minimally tender LLQ     Data:  CBC:   Recent Labs     06/09/20 1848 06/10/20  0556 06/11/20  0541   WBC 13.9* 11.5* 7.6   HGB 15.6 14.0 14.2   HCT 45.5 41.3 41.1    186 219     BMP:    Recent Labs     06/09/20 1848 06/10/20  0556    137   K 4.1 4.0    105   CO2 28 25   BUN 14 10   CREATININE 0.9 0.8*   GLUCOSE 102* 99     Hepatic:   Recent Labs     06/09/20 1848   AST 15   ALT 18   BILITOT 0.8   ALKPHOS 62         ASSESSMENT AND PLAN:  Acute sigmoid diverticulitis without abscess or bleeding. Doing well. Continue with IV antibiotics today. Advance diet slowly today and saline lock     Plan for d/c tomorrow on oral antibiotics if continues to do well. Electronically signed by HONG Shine - CNP     [id number]      Patient seen and agree with above. Pain minimal and no nausea. No fevers or chills. WBC normal  Advance diet as tolerated.   Continue IV abx  Possible discharge tomorrow    Katlyn Mancilla MD

## 2020-06-11 NOTE — PROGRESS NOTES
Pt tolerated full liquid dinner. Advanced to low fiber for breakfast tomorrow morning. Is drinking adequate fluids so IVF stopped.

## 2020-06-11 NOTE — PROGRESS NOTES
Pt alert and oriented, VSS. Shift assessment completed and documented. Pt tolerating clear liquid diet, denies any increased abd pain/nausea. Is having some loose/watery stools. C/o headache, PRN tylenol given, see eMAR. Denies any further needs at this time. Bed locked and in lowest position, call light within reach. Will continue to monitor.

## 2020-06-12 VITALS
DIASTOLIC BLOOD PRESSURE: 91 MMHG | RESPIRATION RATE: 16 BRPM | WEIGHT: 211.9 LBS | HEART RATE: 78 BPM | OXYGEN SATURATION: 96 % | TEMPERATURE: 97.7 F | SYSTOLIC BLOOD PRESSURE: 126 MMHG | BODY MASS INDEX: 34.06 KG/M2 | HEIGHT: 66 IN

## 2020-06-12 PROCEDURE — 6360000002 HC RX W HCPCS: Performed by: SURGERY

## 2020-06-12 PROCEDURE — 2580000003 HC RX 258: Performed by: SURGERY

## 2020-06-12 PROCEDURE — 99238 HOSP IP/OBS DSCHRG MGMT 30/<: CPT | Performed by: SURGERY

## 2020-06-12 PROCEDURE — 6370000000 HC RX 637 (ALT 250 FOR IP): Performed by: SURGERY

## 2020-06-12 RX ORDER — GREEN TEA/HOODIA GORDONII 315-12.5MG
1 CAPSULE ORAL DAILY
Qty: 30 TABLET | Refills: 0 | Status: SHIPPED | OUTPATIENT
Start: 2020-06-12 | End: 2020-07-12

## 2020-06-12 RX ORDER — AMOXICILLIN AND CLAVULANATE POTASSIUM 875; 125 MG/1; MG/1
1 TABLET, FILM COATED ORAL 2 TIMES DAILY
Qty: 20 TABLET | Refills: 0 | Status: SHIPPED | OUTPATIENT
Start: 2020-06-12 | End: 2020-06-22

## 2020-06-12 RX ADMIN — FAMOTIDINE 20 MG: 20 TABLET, FILM COATED ORAL at 09:20

## 2020-06-12 RX ADMIN — PIPERACILLIN AND TAZOBACTAM 3.38 G: 3; .375 INJECTION, POWDER, LYOPHILIZED, FOR SOLUTION INTRAVENOUS at 06:00

## 2020-06-12 NOTE — CARE COORDINATION
D/c order noted. Spoke with Aparna PATTERSON. Patient transitioned to oral ATBX. IPTA denied needs.  Kathleen Bowen RN

## 2020-06-12 NOTE — PROGRESS NOTES
[facility] SURGERY    PATIENT NAME: Caryn Scott     TODAY'S DATE: 6/12/2020    CHIEF COMPLAINT: abd pain    INTERVAL HISTORY/HPI:    Pt with minimal lower abd pain, no nausea or emesis, having bms, no fevers or chills. REVIEW OF SYSTEMS:  Pertinent positives and negatives as per interval history section    OBJECTIVE:  VITALS:  BP (!) 126/91   Pulse 78   Temp 97.7 °F (36.5 °C) (Oral)   Resp 16   Ht 5' 6\" (1.676 m)   Wt 211 lb 14.4 oz (96.1 kg)   SpO2 96%   BMI 34.20 kg/m²     INTAKE/OUTPUT:    I/O last 3 completed shifts: In: 6671 [P.O.:3140; I.V.:1100]  Out: 0   No intake/output data recorded. CONSTITUTIONAL:  awake and alert  LUNGS:  Respirations easy and unlabored, no crackles or wheezing  CARD:  regular rate and rhythm  ABDOMEN:  normal bowel sounds, soft, non-distended, miniml LLQ tender     Data:  CBC:   Recent Labs     06/09/20 1848 06/10/20  0556 06/11/20  0541   WBC 13.9* 11.5* 7.6   HGB 15.6 14.0 14.2   HCT 45.5 41.3 41.1    186 219     BMP:    Recent Labs     06/09/20 1848 06/10/20  0556    137   K 4.1 4.0    105   CO2 28 25   BUN 14 10   CREATININE 0.9 0.8*   GLUCOSE 102* 99     Hepatic:   Recent Labs     06/09/20 1848   AST 15   ALT 18   BILITOT 0.8   ALKPHOS 62     Mag:    No results for input(s): MG in the last 72 hours. Phos:   No results for input(s): PHOS in the last 72 hours. INR: No results for input(s): INR in the last 72 hours. Radiology Review:  *Imaging personally reviewed by me. NA      ASSESSMENT AND PLAN:  37 yo with diverticulitis  1. Tolerating diet and pain minimal  2.   Plan discharge on oral abx.  3.  Will see in office in two weeks     Electronically signed by Genaro Astorga MD     [id number]

## 2020-06-12 NOTE — PROGRESS NOTES
Pt discharged per order. Pt given written and verbal discharge instructions. All questions answered. IVs removed without complication. Left floor in stable condition to home.

## 2020-06-26 ENCOUNTER — OFFICE VISIT (OUTPATIENT)
Dept: SURGERY | Age: 45
End: 2020-06-26
Payer: COMMERCIAL

## 2020-06-26 VITALS
BODY MASS INDEX: 33.43 KG/M2 | DIASTOLIC BLOOD PRESSURE: 82 MMHG | WEIGHT: 208 LBS | HEART RATE: 75 BPM | TEMPERATURE: 96.9 F | SYSTOLIC BLOOD PRESSURE: 114 MMHG | HEIGHT: 66 IN

## 2020-06-26 PROCEDURE — 99213 OFFICE O/P EST LOW 20 MIN: CPT | Performed by: SURGERY

## 2020-06-26 RX ORDER — POLYETHYLENE GLYCOL 3350 17 G/17G
POWDER, FOR SOLUTION ORAL
Qty: 238 G | Refills: 0 | Status: ON HOLD | OUTPATIENT
Start: 2020-06-26 | End: 2020-08-14 | Stop reason: HOSPADM

## 2020-06-26 RX ORDER — BISACODYL 5 MG
TABLET, DELAYED RELEASE (ENTERIC COATED) ORAL
Qty: 4 TABLET | Refills: 0 | Status: ON HOLD | OUTPATIENT
Start: 2020-06-26 | End: 2020-08-14 | Stop reason: HOSPADM

## 2020-06-26 NOTE — PROGRESS NOTES
Physical activity     Days per week: Not on file     Minutes per session: Not on file    Stress: Not on file   Relationships    Social connections     Talks on phone: Not on file     Gets together: Not on file     Attends Hindu service: Not on file     Active member of club or organization: Not on file     Attends meetings of clubs or organizations: Not on file     Relationship status: Not on file    Intimate partner violence     Fear of current or ex partner: Not on file     Emotionally abused: Not on file     Physically abused: Not on file     Forced sexual activity: Not on file   Other Topics Concern    Not on file   Social History Narrative    Not on file          Vitals:    06/26/20 1314   BP: 114/82   Site: Right Wrist   Position: Sitting   Cuff Size: Medium Adult   Pulse: 75   Temp: 96.9 °F (36.1 °C)   Weight: 208 lb (94.3 kg)   Height: 5' 6\" (1.676 m)     Body mass index is 33.57 kg/m². Wt Readings from Last 3 Encounters:   06/26/20 208 lb (94.3 kg)   06/09/20 211 lb 14.4 oz (96.1 kg)   04/15/19 208 lb (94.3 kg)     BP Readings from Last 3 Encounters:   06/26/20 114/82   06/12/20 (!) 126/91   04/15/19 122/86          REVIEW OF SYSTEMS:   · All other systems reviewed; please refer to HPI with pertinent positives, all other ROS are negative    PHYSICAL EXAM:    CONSTITUTIONAL:  awake, alert, no apparent distress and mildly obese  ENT:  normocepalic, without obvious abnormality  NECK:  supple, symmetrical, trachea midline   LUNGS:  Resp easy and unlabored  CARDIOVASCULAR:    ABDOMEN:  Soft, non-distended  MUSCULOSKELETAL: No edema  NEUROLOGIC:  Mental Status Exam:  Level of Alertness:   awake  Orientation:   person, place, time        DATA:  Radiology Review:   CT abd/pel (6/9)  1. Findings most consistent with changes related to sigmoid diverticulitis. Follow-up CT examination and/or endoscopy following treatment is recommended   to document resolution of the colonic findings.    2. Focal cluster

## 2020-08-14 ENCOUNTER — HOSPITAL ENCOUNTER (OUTPATIENT)
Age: 45
Setting detail: OUTPATIENT SURGERY
Discharge: HOME | End: 2020-08-14
Attending: INTERNAL MEDICINE | Admitting: INTERNAL MEDICINE
Payer: COMMERCIAL

## 2020-08-14 VITALS
DIASTOLIC BLOOD PRESSURE: 78 MMHG | SYSTOLIC BLOOD PRESSURE: 123 MMHG | OXYGEN SATURATION: 98 % | HEART RATE: 62 BPM | HEIGHT: 65 IN | WEIGHT: 190 LBS | RESPIRATION RATE: 18 BRPM | TEMPERATURE: 97 F | BODY MASS INDEX: 31.65 KG/M2

## 2020-08-14 PROBLEM — K57.32 SIGMOID DIVERTICULITIS: Status: ACTIVE | Noted: 2020-08-14

## 2020-08-14 PROCEDURE — 2709999900 HC NON-CHARGEABLE SUPPLY: Performed by: INTERNAL MEDICINE

## 2020-08-14 PROCEDURE — 2580000003 HC RX 258: Performed by: INTERNAL MEDICINE

## 2020-08-14 PROCEDURE — 99153 MOD SED SAME PHYS/QHP EA: CPT | Performed by: INTERNAL MEDICINE

## 2020-08-14 PROCEDURE — 45378 DIAGNOSTIC COLONOSCOPY: CPT | Performed by: INTERNAL MEDICINE

## 2020-08-14 PROCEDURE — 3609027000 HC COLONOSCOPY: Performed by: INTERNAL MEDICINE

## 2020-08-14 PROCEDURE — 7100000011 HC PHASE II RECOVERY - ADDTL 15 MIN: Performed by: INTERNAL MEDICINE

## 2020-08-14 PROCEDURE — 6360000002 HC RX W HCPCS: Performed by: INTERNAL MEDICINE

## 2020-08-14 PROCEDURE — 7100000010 HC PHASE II RECOVERY - FIRST 15 MIN: Performed by: INTERNAL MEDICINE

## 2020-08-14 PROCEDURE — 99152 MOD SED SAME PHYS/QHP 5/>YRS: CPT | Performed by: INTERNAL MEDICINE

## 2020-08-14 RX ORDER — FENTANYL CITRATE 50 UG/ML
INJECTION, SOLUTION INTRAMUSCULAR; INTRAVENOUS PRN
Status: DISCONTINUED | OUTPATIENT
Start: 2020-08-14 | End: 2020-08-14 | Stop reason: ALTCHOICE

## 2020-08-14 RX ORDER — MIDAZOLAM HYDROCHLORIDE 5 MG/ML
INJECTION INTRAMUSCULAR; INTRAVENOUS PRN
Status: DISCONTINUED | OUTPATIENT
Start: 2020-08-14 | End: 2020-08-14 | Stop reason: ALTCHOICE

## 2020-08-14 RX ORDER — SODIUM CHLORIDE, SODIUM LACTATE, POTASSIUM CHLORIDE, AND CALCIUM CHLORIDE .6; .31; .03; .02 G/100ML; G/100ML; G/100ML; G/100ML
INJECTION, SOLUTION INTRAVENOUS CONTINUOUS PRN
Status: COMPLETED | OUTPATIENT
Start: 2020-08-14 | End: 2020-08-14

## 2020-08-14 RX ORDER — SODIUM CHLORIDE, SODIUM LACTATE, POTASSIUM CHLORIDE, CALCIUM CHLORIDE 600; 310; 30; 20 MG/100ML; MG/100ML; MG/100ML; MG/100ML
INJECTION, SOLUTION INTRAVENOUS ONCE
Status: COMPLETED | OUTPATIENT
Start: 2020-08-14 | End: 2020-08-14

## 2020-08-14 RX ADMIN — SODIUM CHLORIDE, POTASSIUM CHLORIDE, SODIUM LACTATE AND CALCIUM CHLORIDE: 600; 310; 30; 20 INJECTION, SOLUTION INTRAVENOUS at 09:34

## 2020-08-14 ASSESSMENT — PAIN SCALES - GENERAL
PAINLEVEL_OUTOF10: 0

## 2020-08-14 ASSESSMENT — PAIN - FUNCTIONAL ASSESSMENT: PAIN_FUNCTIONAL_ASSESSMENT: 0-10

## 2020-08-14 NOTE — H&P
[address] ,  [address] [facility]  Phone: [phone]    CHIEF COMPLAINT     Here for colonoscopy. NELL Burk is a 54 y.o. male who presents for colonoscopy, referred by Surgery. He was admitted 6/9/2020 to 6/12/2020 with an episode of complicated diverticulitis with a microperforation in the sigmoid colon. He was managed conservatively with antibiotics and discharged. He feels his abdominal pain resolved after this incident. Does not have any abdominal pain at this time. Takes Psyllium fiber daily. Denies family history of colon cancer in first degree family members. PAST MEDICAL HISTORY     Past Medical History:   Diagnosis Date    PONV (postoperative nausea and vomiting)      FAMILY HISTORY   History reviewed. No pertinent family history.   SOCIAL HISTORY     Social History     Socioeconomic History    Marital status:      Spouse name: Not on file    Number of children: Not on file    Years of education: Not on file    Highest education level: Not on file   Occupational History    Not on file   Social Needs    Financial resource strain: Not on file    Food insecurity     Worry: Not on file     Inability: Not on file    Transportation needs     Medical: Not on file     Non-medical: Not on file   Tobacco Use    Smoking status: Never Smoker    Smokeless tobacco: Current User     Types: Chew   Substance and Sexual Activity    Alcohol use: Yes     Comment: 3/wk    Drug use: No    Sexual activity: Not on file   Lifestyle    Physical activity     Days per week: Not on file     Minutes per session: Not on file    Stress: Not on file   Relationships    Social connections     Talks on phone: Not on file     Gets together: Not on file     Attends Confucianism service: Not on file     Active member of club or organization: Not on file     Attends meetings of clubs or organizations: Not on file     Relationship status: Not on file    Intimate partner violence     Fear of current or ex partner: Not on file     Emotionally abused: Not on file     Physically abused: Not on file     Forced sexual activity: Not on file   Other Topics Concern    Not on file   Social History Narrative    Not on file     SURGICAL HISTORY     Past Surgical History:   Procedure Laterality Date    SHOULDER ARTHROSCOPY Bilateral      CURRENT MEDICATIONS     No current facility-administered medications on file prior to encounter. Current Outpatient Medications on File Prior to Encounter   Medication Sig Dispense Refill    psyllium (KONSYL) 28.3 % PACK Take 1 packet by mouth daily      Probiotic Product (PROBIOTIC-10 PO) Take by mouth      polyethylene glycol (MIRALAX) 17 GM/SCOOP powder Use as directed for colonoscopy prep 238 g 0    bisacodyl (DULCOLAX) 5 MG EC tablet Use as directed for colonoscopy prep 4 tablet 0     ALLERGIES   No Known Allergies  IMMUNIZATIONS     There is no immunization history on file for this patient. REVIEW OF SYSTEMS     Constitutional: Negative for appetite change, chills, fatigue, fever and unexpected weight change. HENT: Negative for ear pain, hearing loss and nosebleeds. Eyes: Negative for pain and visual disturbance. Respiratory: Negative for cough, shortness of breath and wheezing. Cardiovascular: Negative for chest pain, palpitations and leg swelling. Gastrointestinal: see HPI for details. Endocrine: Negative for polydipsia, polyphagia and polyuria. Genitourinary: Negative for difficulty urinating, dysuria, hematuria and urgency. Musculoskeletal: Positive for arthralgias and back pain. Skin: Negative for pallor and rash. Allergic/Immunologic: Negative for environmental allergies and immunocompromised state. Neurological: Negative for seizures, syncope and numbness. Hematological: Negative for adenopathy. Does not bruise/bleed easily.    Psychiatric/Behavioral: Negative for agitation, confusion, hallucinations and suicidal ideas. PHYSICAL EXAM   There were no vitals taken for this visit. Wt Readings from Last 3 Encounters:   06/26/20 208 lb (94.3 kg)   06/09/20 211 lb 14.4 oz (96.1 kg)   04/15/19 208 lb (94.3 kg)     Constitutional: AAO3, No acute distress  HEENT: no pallor or icterus. Cardiovascular: Normal heart rate, Normal rhythm, No murmurs,. RS: Normal breath sounds, No wheezing,   Abdomen: soft, non tender,obese, Bs+. Extremities:  No edema. FINAL IMPRESSION   Proceed with colonoscopy for recent episode of complicated diverticulitis needing hospital admission. A colonoscopy is indicated to rule out large colon polyps or colon cancer mimicking or causing the symptoms. The risk of aggravating smouldering/active diverticulitis if present with colonoscopy was discussed. He understands there is a risk of perforation and this may need emergent surgery. He also understands he will need to followup back with Dr Waylon Hinson (General Surgery) to consider elective sigmoid resection given recent complicated diverticulitis to prevent recurrence. Sedation plan: moderate  Procedure discussed with patient in detail including risks and benefits. Anesthesia risks, risk of perforation, bleeding discussed with the patient.

## 2020-09-08 ENCOUNTER — HOSPITAL ENCOUNTER (INPATIENT)
Age: 45
LOS: 4 days | Discharge: HOME | DRG: 392 | End: 2020-09-12
Attending: EMERGENCY MEDICINE | Admitting: SURGERY
Payer: COMMERCIAL

## 2020-09-08 ENCOUNTER — APPOINTMENT (OUTPATIENT)
Dept: CT IMAGING | Age: 45
DRG: 392 | End: 2020-09-08
Payer: COMMERCIAL

## 2020-09-08 ENCOUNTER — OFFICE VISIT (OUTPATIENT)
Dept: SURGERY | Age: 45
End: 2020-09-08
Payer: COMMERCIAL

## 2020-09-08 VITALS
HEIGHT: 65 IN | BODY MASS INDEX: 35.35 KG/M2 | TEMPERATURE: 97.1 F | WEIGHT: 212.2 LBS | HEART RATE: 96 BPM | SYSTOLIC BLOOD PRESSURE: 130 MMHG | DIASTOLIC BLOOD PRESSURE: 89 MMHG

## 2020-09-08 PROBLEM — K57.92 ACUTE DIVERTICULITIS: Status: ACTIVE | Noted: 2020-09-08

## 2020-09-08 LAB
A/G RATIO: 1.6 (ref 1.1–2.2)
ALBUMIN SERPL-MCNC: 4.7 G/DL (ref 3.4–5)
ALP BLD-CCNC: 64 U/L (ref 40–129)
ALT SERPL-CCNC: 19 U/L (ref 10–40)
ANION GAP SERPL CALCULATED.3IONS-SCNC: 12 MMOL/L (ref 3–16)
AST SERPL-CCNC: 19 U/L (ref 15–37)
BASOPHILS ABSOLUTE: 0.1 K/UL (ref 0–0.2)
BASOPHILS RELATIVE PERCENT: 0.6 %
BILIRUB SERPL-MCNC: 1.3 MG/DL (ref 0–1)
BUN BLDV-MCNC: 18 MG/DL (ref 7–20)
CALCIUM SERPL-MCNC: 9.1 MG/DL (ref 8.3–10.6)
CHLORIDE BLD-SCNC: 102 MMOL/L (ref 99–110)
CO2: 23 MMOL/L (ref 21–32)
CREAT SERPL-MCNC: 0.9 MG/DL (ref 0.9–1.3)
EOSINOPHILS ABSOLUTE: 0.1 K/UL (ref 0–0.6)
EOSINOPHILS RELATIVE PERCENT: 0.8 %
GFR AFRICAN AMERICAN: >60
GFR NON-AFRICAN AMERICAN: >60
GLOBULIN: 2.9 G/DL
GLUCOSE BLD-MCNC: 109 MG/DL (ref 70–99)
HCT VFR BLD CALC: 47.2 % (ref 40.5–52.5)
HEMOGLOBIN: 16.3 G/DL (ref 13.5–17.5)
LACTIC ACID: 0.9 MMOL/L (ref 0.4–2)
LIPASE: 19 U/L (ref 13–60)
LYMPHOCYTES ABSOLUTE: 1.7 K/UL (ref 1–5.1)
LYMPHOCYTES RELATIVE PERCENT: 11.7 %
MCH RBC QN AUTO: 30.3 PG (ref 26–34)
MCHC RBC AUTO-ENTMCNC: 34.4 G/DL (ref 31–36)
MCV RBC AUTO: 88 FL (ref 80–100)
MONOCYTES ABSOLUTE: 1.2 K/UL (ref 0–1.3)
MONOCYTES RELATIVE PERCENT: 8 %
NEUTROPHILS ABSOLUTE: 11.4 K/UL (ref 1.7–7.7)
NEUTROPHILS RELATIVE PERCENT: 78.9 %
PDW BLD-RTO: 13.7 % (ref 12.4–15.4)
PLATELET # BLD: 218 K/UL (ref 135–450)
PMV BLD AUTO: 7 FL (ref 5–10.5)
POTASSIUM REFLEX MAGNESIUM: 3.9 MMOL/L (ref 3.5–5.1)
RBC # BLD: 5.37 M/UL (ref 4.2–5.9)
SODIUM BLD-SCNC: 137 MMOL/L (ref 136–145)
TOTAL PROTEIN: 7.6 G/DL (ref 6.4–8.2)
WBC # BLD: 14.5 K/UL (ref 4–11)

## 2020-09-08 PROCEDURE — 2500000003 HC RX 250 WO HCPCS: Performed by: SURGERY

## 2020-09-08 PROCEDURE — 6360000002 HC RX W HCPCS: Performed by: EMERGENCY MEDICINE

## 2020-09-08 PROCEDURE — 99212 OFFICE O/P EST SF 10 MIN: CPT | Performed by: SURGERY

## 2020-09-08 PROCEDURE — 6360000002 HC RX W HCPCS: Performed by: NURSE PRACTITIONER

## 2020-09-08 PROCEDURE — 80053 COMPREHEN METABOLIC PANEL: CPT

## 2020-09-08 PROCEDURE — 96365 THER/PROPH/DIAG IV INF INIT: CPT

## 2020-09-08 PROCEDURE — 83690 ASSAY OF LIPASE: CPT

## 2020-09-08 PROCEDURE — 6360000004 HC RX CONTRAST MEDICATION: Performed by: NURSE PRACTITIONER

## 2020-09-08 PROCEDURE — 2580000003 HC RX 258: Performed by: SURGERY

## 2020-09-08 PROCEDURE — 6370000000 HC RX 637 (ALT 250 FOR IP): Performed by: SURGERY

## 2020-09-08 PROCEDURE — 1200000000 HC SEMI PRIVATE

## 2020-09-08 PROCEDURE — 85025 COMPLETE CBC W/AUTO DIFF WBC: CPT

## 2020-09-08 PROCEDURE — 74177 CT ABD & PELVIS W/CONTRAST: CPT

## 2020-09-08 PROCEDURE — 2580000003 HC RX 258: Performed by: NURSE PRACTITIONER

## 2020-09-08 PROCEDURE — 99285 EMERGENCY DEPT VISIT HI MDM: CPT

## 2020-09-08 PROCEDURE — 83605 ASSAY OF LACTIC ACID: CPT

## 2020-09-08 RX ORDER — FAMOTIDINE 20 MG/1
20 TABLET, FILM COATED ORAL 2 TIMES DAILY
Status: DISCONTINUED | OUTPATIENT
Start: 2020-09-08 | End: 2020-09-12 | Stop reason: HOSPADM

## 2020-09-08 RX ORDER — MORPHINE SULFATE 2 MG/ML
2 INJECTION, SOLUTION INTRAMUSCULAR; INTRAVENOUS
Status: DISCONTINUED | OUTPATIENT
Start: 2020-09-08 | End: 2020-09-09

## 2020-09-08 RX ORDER — SODIUM CHLORIDE 0.9 % (FLUSH) 0.9 %
10 SYRINGE (ML) INJECTION EVERY 12 HOURS SCHEDULED
Status: DISCONTINUED | OUTPATIENT
Start: 2020-09-08 | End: 2020-09-12 | Stop reason: HOSPADM

## 2020-09-08 RX ORDER — DROPERIDOL 2.5 MG/ML
1.25 INJECTION, SOLUTION INTRAMUSCULAR; INTRAVENOUS
Status: COMPLETED | OUTPATIENT
Start: 2020-09-08 | End: 2020-09-08

## 2020-09-08 RX ORDER — MORPHINE SULFATE 4 MG/ML
4 INJECTION, SOLUTION INTRAMUSCULAR; INTRAVENOUS
Status: DISCONTINUED | OUTPATIENT
Start: 2020-09-08 | End: 2020-09-09

## 2020-09-08 RX ORDER — SODIUM CHLORIDE 0.9 % (FLUSH) 0.9 %
10 SYRINGE (ML) INJECTION PRN
Status: DISCONTINUED | OUTPATIENT
Start: 2020-09-08 | End: 2020-09-12 | Stop reason: HOSPADM

## 2020-09-08 RX ORDER — ACETAMINOPHEN 325 MG/1
650 TABLET ORAL EVERY 4 HOURS PRN
Status: DISCONTINUED | OUTPATIENT
Start: 2020-09-08 | End: 2020-09-12 | Stop reason: HOSPADM

## 2020-09-08 RX ORDER — SODIUM CHLORIDE 9 MG/ML
INJECTION, SOLUTION INTRAVENOUS CONTINUOUS
Status: DISCONTINUED | OUTPATIENT
Start: 2020-09-08 | End: 2020-09-12 | Stop reason: HOSPADM

## 2020-09-08 RX ORDER — ONDANSETRON 2 MG/ML
4 INJECTION INTRAMUSCULAR; INTRAVENOUS EVERY 6 HOURS PRN
Status: DISCONTINUED | OUTPATIENT
Start: 2020-09-08 | End: 2020-09-12 | Stop reason: HOSPADM

## 2020-09-08 RX ADMIN — METRONIDAZOLE 500 MG: 500 INJECTION, SOLUTION INTRAVENOUS at 21:33

## 2020-09-08 RX ADMIN — ACETAMINOPHEN 650 MG: 325 TABLET ORAL at 21:36

## 2020-09-08 RX ADMIN — DROPERIDOL 1.25 MG: 2.5 INJECTION, SOLUTION INTRAMUSCULAR; INTRAVENOUS at 18:39

## 2020-09-08 RX ADMIN — SODIUM CHLORIDE: 900 INJECTION INTRAVENOUS at 20:44

## 2020-09-08 RX ADMIN — Medication 10 ML: at 20:47

## 2020-09-08 RX ADMIN — FAMOTIDINE 20 MG: 20 TABLET, FILM COATED ORAL at 21:36

## 2020-09-08 RX ADMIN — SODIUM CHLORIDE: 900 INJECTION INTRAVENOUS at 21:33

## 2020-09-08 RX ADMIN — IOPAMIDOL 75 ML: 755 INJECTION, SOLUTION INTRAVENOUS at 17:09

## 2020-09-08 RX ADMIN — PIPERACILLIN AND TAZOBACTAM 3.38 G: 3; .375 INJECTION, POWDER, LYOPHILIZED, FOR SOLUTION INTRAVENOUS at 17:45

## 2020-09-08 ASSESSMENT — PAIN SCALES - GENERAL
PAINLEVEL_OUTOF10: 6
PAINLEVEL_OUTOF10: 6
PAINLEVEL_OUTOF10: 8

## 2020-09-08 ASSESSMENT — PAIN DESCRIPTION - PAIN TYPE: TYPE: ACUTE PAIN

## 2020-09-08 ASSESSMENT — PAIN DESCRIPTION - ORIENTATION: ORIENTATION: MID

## 2020-09-08 ASSESSMENT — PAIN DESCRIPTION - LOCATION
LOCATION: ABDOMEN
LOCATION: ABDOMEN

## 2020-09-08 ASSESSMENT — PAIN DESCRIPTION - DESCRIPTORS: DESCRIPTORS: PRESSURE;SHARP;SHOOTING

## 2020-09-08 NOTE — ED NOTES
1742 - PS to Dr Sonia Sams at general surgery  Re:  perforated diverticulum  1800 - Dr Sonia Sams called back to speak with NP Shelia Martin  09/08/20 1801

## 2020-09-08 NOTE — PROGRESS NOTES
New Patient [facility] Surgery Marcella Cross, [address] [facility], [address] [facility], [address] [facility]  Phone: [phone]  Fax: [phone]   YOB: 1975    Date of Visit:  9/8/2020    No ref. provider found  Mar Mcleod MD    HPI:   ABD Pain/Diverticulitis: Patient is 54y.o. year old male seen at request of Mar Mcleod MD.  Patient complains of chills, diffuse abdominal pain and fever. The pain is described as pressure-like. Onset was 1 day ago. Symptoms have been gradually worsening since. Aggravating factors: activity, bowel movement, movement, pressure and urination. Alleviating factors: recumbency. Associated symptoms: chills, fever and sweats. The patient denies constipation, diarrhea and vomiting. The patient has had a colonoscopy. 06/26/2020 Follow-up:  \"Patient was recently admitted with his first episode of acute uncomplicated diverticulitis which resolved with IV/PO antibiotics. Pt now reports feeling well, no further abdominal pain. He does note occasional \"pressure\" sensation around his bladder when he needs to urinate. Denies fever, chills, diarrhea, melena. The patient has not had a colonoscopy. No family history of colorectal cancer. \"        No Known Allergies  Outpatient Medications Marked as Taking for the 9/8/20 encounter (Office Visit) with Flor Nicolas MD   Medication Sig Dispense Refill    psyllium (KONSYL) 28.3 % PACK Take 1 packet by mouth daily      Probiotic Product (PROBIOTIC-10 PO) Take by mouth         Past Medical History:   Diagnosis Date    PONV (postoperative nausea and vomiting)      Past Surgical History:   Procedure Laterality Date    COLONOSCOPY N/A 8/14/2020    COLONOSCOPY DIAGNOSTIC performed by Grzegorz Phan MD at [address] [place] [address] ARTHROSCOPY Bilateral      History reviewed. No pertinent family history.   Social History Socioeconomic History    Marital status:      Spouse name: Not on file    Number of children: Not on file    Years of education: Not on file    Highest education level: Not on file   Occupational History    Not on file   Social Needs    Financial resource strain: Not on file    Food insecurity     Worry: Not on file     Inability: Not on file    Transportation needs     Medical: Not on file     Non-medical: Not on file   Tobacco Use    Smoking status: Never Smoker    Smokeless tobacco: Current User     Types: Chew   Substance and Sexual Activity    Alcohol use: Yes     Comment: 3/wk    Drug use: No    Sexual activity: Not on file   Lifestyle    Physical activity     Days per week: Not on file     Minutes per session: Not on file    Stress: Not on file   Relationships    Social connections     Talks on phone: Not on file     Gets together: Not on file     Attends Orthodox service: Not on file     Active member of club or organization: Not on file     Attends meetings of clubs or organizations: Not on file     Relationship status: Not on file    Intimate partner violence     Fear of current or ex partner: Not on file     Emotionally abused: Not on file     Physically abused: Not on file     Forced sexual activity: Not on file   Other Topics Concern    Not on file   Social History Narrative    Not on file          Vitals:    09/08/20 1424   BP: 130/89   Site: Left Wrist   Position: Sitting   Cuff Size: Medium Adult   Pulse: 96   Temp: 97.1 °F (36.2 °C)   Weight: 212 lb 3.2 oz (96.3 kg)   Height: 5' 5\" (1.651 m)     Body mass index is 35.31 kg/m².      Wt Readings from Last 3 Encounters:   09/08/20 212 lb 3.2 oz (96.3 kg)   08/14/20 190 lb (86.2 kg)   06/26/20 208 lb (94.3 kg)     BP Readings from Last 3 Encounters:   09/08/20 130/89   08/14/20 123/78   06/26/20 114/82          REVIEW OF SYSTEMS:   · All other systems reviewed; please refer to HPI with pertinent positives, all other ROS are negative    PHYSICAL EXAM:    CONSTITUTIONAL:  awake, alert, mild distress and moderately obese, + sweating  ENT:  normocepalic, without obvious abnormality  NECK:  supple, symmetrical, trachea midline   LUNGS:  Resp easy and unlabored  CARDIOVASCULAR:  No chest heaving or visible pulsations  ABDOMEN: tense, non-distended, tenderness noted diffusely, voluntary guarding present,  and hernia absent  MUSCULOSKELETAL: No edema  NEUROLOGIC:  Mental Status Exam:  Level of Alertness:   awake  Orientation:   Person, place, time      DATA:  Colonoscopy 08/14/2020:  Findings: -There is mild diverticulosis in the sigmoid colon. Otherwise normal colonoscopy to the terminal ileum. ASSESSMENT:     Diagnosis Orders   1. Generalized abdominal pain     - Pt was recently treated for diverticulitis in June, this could be a recurrent flare  - There are multiple differentials for abdominal pain that cannot be further worked-up in the office      PLAN:    Pt was referred to the ED for emergent care. Follow-up in office as needed. Juve Higgins DO  2:53 PM  09/08/2020    Surgery Staff    I have examined this patient and read and agree with the note by Emily Shepherd from today. Pt is acutely ill and requires higher level of care than can be provided here in office. Anticipate admission from ED with either repeat IV antibiotic therapy or possibly emergent surgery based on results of workup and response to resuscitation.     KAROLYN [employer] [facility]

## 2020-09-08 NOTE — ED PROVIDER NOTES
I independently performed a history and physical on Clarissa Rodriguez. All diagnostic, treatment, and disposition decisions were made by myself in conjunction with the advanced practice provider. For further details of Clarissa Rodriguez emergency department encounter, please see Monica Gonzalez NP's documentation. Patient is a 41-year-old male presenting today due to concern for abdominal pain and pressure starting last night but worse today. He has a recent history of perforated diverticulitis. Dr. Inetta Babinski with general surgery recommended he go to the ED based on his repeat symptoms. He denies any nausea or vomiting. He does not want any pain medication at this time since he does not like how they make him feel. Currently his pain is 8 out of 10. He does have a fever although denies any chest pain or shortness of breath and he denies any exposure to COVID that he is aware of. Physical:   Gen: Mild acute distress. AOx3. Psych: Anxious mood and affect  HEENT: NCAT, MMM  Neck: supple  Cardiac: RRR, pulses 2+ in upper extremities  Lungs: C2AB, no R/R/W  Abdomen: soft and moderate diffuse tenderness worse in the left lower quadrant with no R/D/G  Neuro: no focal neuro deficits with strength 5/5 in all 4 extremities    MDM: Patient was evaluated due to concern for worsening abdominal pain since yesterday. CT showed return of perforated diverticulitis. He was started on Zosyn. General surgery plans to admit the patient. He did not want any pain medication when I saw him. He was in mild distress. He is stable for the floor. I have low suspicion for COVID at this time and even though he reportedly had a fever this morning, I do believe it was related to his diverticulitis, and not COVID.        Florian Cabello MD  09/09/20 5975

## 2020-09-08 NOTE — ED PROVIDER NOTES
[facility]  ED  EMERGENCY DEPARTMENT ENCOUNTER      This patient was seen and evaluated by the attending physician. Pt Name: Natalia Bravo  MRN: [id number]  [place] 1975  Date of evaluation: 9/8/2020  Provider: HONG Rivas - CNP-C  PCP: Ivana Eduardo MD      History provided by the patient. CHIEFCOMPLAINT:     Chief Complaint   Patient presents with    Abdominal Pain     abdominal pain that started as pressure in abdomen last night with increased pain this morning; denies nausea or emesis        HISTORY OF PRESENT ILLNESS:      Natalia Bravo is a 54 y.o. male who presents to [facility]  ED with complaints of nominal pain. Patient states that he had lower abdominal pain that started as a pressure last night, states that he had increased pain this morning with fever. Patient actually went and saw his general surgeon, Dr. Lizzy Cabrales who wanted him evaluated in the ER. Patient presents with having no diarrhea. He states that the pain is gotten worse. He denies chest pain. He denies any difficulty breathing. Patient is here for further evaluation. Joann Woodard  SEVERITY:8  DURATION:yesterday  MODIFYING FACTORS:none noted    Nursing Notes were reviewed     REVIEW OF SYSTEMS:     Review of Systems  All systems, a total of 10, are reviewed and negative except for those that were just noted in history present illness.         PAST MEDICAL HISTORY:     Past Medical History:   Diagnosis Date    PONV (postoperative nausea and vomiting)          SURGICAL HISTORY:      Past Surgical History:   Procedure Laterality Date    COLONOSCOPY N/A 8/14/2020    COLONOSCOPY DIAGNOSTIC performed by Saulo Wilson MD at [address] [place] [address] ARTHROSCOPY Bilateral          CURRENT MEDICATIONS:       Previous Medications    PROBIOTIC PRODUCT (PROBIOTIC-10 PO)    Take by mouth    PSYLLIUM (KONSYL) 28.3 % PACK    Take 1 packet by mouth daily         ALLERGIES:    Patient has no known allergies. FAMILY HISTORY:     History reviewed. No pertinent family history. SOCIAL HISTORY:     Social History     Socioeconomic History    Marital status:      Spouse name: None    Number of children: None    Years of education: None    Highest education level: None   Occupational History    None   Social Needs    Financial resource strain: None    Food insecurity     Worry: None     Inability: None    Transportation needs     Medical: None     Non-medical: None   Tobacco Use    Smoking status: Never Smoker    Smokeless tobacco: Current User     Types: Chew   Substance and Sexual Activity    Alcohol use: Yes     Comment: 3/wk    Drug use: No    Sexual activity: None   Lifestyle    Physical activity     Days per week: None     Minutes per session: None    Stress: None   Relationships    Social connections     Talks on phone: None     Gets together: None     Attends Confucianism service: None     Active member of club or organization: None     Attends meetings of clubs or organizations: None     Relationship status: None    Intimate partner violence     Fear of current or ex partner: None     Emotionally abused: None     Physically abused: None     Forced sexual activity: None   Other Topics Concern    None   Social History Narrative    None       SCREENINGS:             PHYSICAL EXAM:       ED Triage Vitals [09/08/20 1523]   BP Temp Temp Source Pulse Resp SpO2 Height Weight   (!) 142/95 98 °F (36.7 °C) Oral 88 20 96 % 5' 7\" (1.702 m) 200 lb (90.7 kg)       Physical Exam    CONSTITUTIONAL: Awake and alert. Cooperative. Well-developed. Well-nourished. Vitals:    09/08/20 1523 09/08/20 1739 09/08/20 1821   BP: (!) 142/95 139/83 (!) 139/99   Pulse: 88 78 86   Resp: 20 20 20   Temp: 98 °F (36.7 °C)     TempSrc: Oral     SpO2: 96% 94% 94%   Weight: 200 lb (90.7 kg)     Height: 5' 7\" (1.702 m)       HENT: Normocephalic. Atraumatic. External ears normal, without discharge. TMs clear bilaterally. Nonasal discharge. Oropharynx clear, no erythema. Mucous membranes moist.  EYES: Conjunctiva non-injected, nolid abnormalities noted. No scleral icterus. PERRL. EOM's grossly intact. Anterior chambers clear. NECK: Supple. Normal ROM. No meningismus. No thyroid tenderness or swelling noted. CARDIOVASCULAR: RRR. No Murmer. No carotid bruits. PULMONARY/CHEST WALL: Effort normal. No tachypnea. Lungs clear to ausculation. ABDOMEN: Normal BS. Soft. Nondistended. Diffuse tenderness noted throughout the abdomen. No guarding. No hernias noted. No splenomegaly. Back: Spine is midline. No ecchymosis. No crepituson palpation. No obvious subluxation of vertebral column. No saddle anesthesia or evidence of cauda equina. /ANORECTAL: Not assessed  MUSKULOSKELETAL: Normal ROM. No acute deformities. No edema. No tenderness to palpate. SKIN: Warm and dry. NEUROLOGICAL:  GCS 15. CN II-XII grossly intact. Strength is 5/5 in all extremities and sensation is intact. PSYCHIATRIC: Normal affect, normal insight and judgement. Alert and oriented x 3.         DIAGNOSTIC RESULTS:     LABS:    Results for orders placed or performed during the hospital encounter of 09/08/20   CBC Auto Differential   Result Value Ref Range    WBC 14.5 (H) 4.0 - 11.0 K/uL    RBC 5.37 4.20 - 5.90 M/uL    Hemoglobin 16.3 13.5 - 17.5 g/dL    Hematocrit 47.2 40.5 - 52.5 %    MCV 88.0 80.0 - 100.0 fL    MCH 30.3 26.0 - 34.0 pg    MCHC 34.4 31.0 - 36.0 g/dL    RDW 13.7 12.4 - 15.4 %    Platelets 882 078 - 973 K/uL    MPV 7.0 5.0 - 10.5 fL    Neutrophils % 78.9 %    Lymphocytes % 11.7 %    Monocytes % 8.0 %    Eosinophils % 0.8 %    Basophils % 0.6 %    Neutrophils Absolute 11.4 (H) 1.7 - 7.7 K/uL    Lymphocytes Absolute 1.7 1.0 - 5.1 K/uL    Monocytes Absolute 1.2 0.0 - 1.3 K/uL    Eosinophils Absolute 0.1 0.0 - 0.6 K/uL    Basophils Absolute 0.1 0.0 - 0.2 K/uL   Comprehensive Metabolic Panel w/ Reflex to MG   Result Value Ref Range    Sodium 137 136 - 145 mmol/L    Potassium reflex Magnesium 3.9 3.5 - 5.1 mmol/L    Chloride 102 99 - 110 mmol/L    CO2 23 21 - 32 mmol/L    Anion Gap 12 3 - 16    Glucose 109 (H) 70 - 99 mg/dL    BUN 18 7 - 20 mg/dL    CREATININE 0.9 0.9 - 1.3 mg/dL    GFR Non-African American >60 >60    GFR African American >60 >60    Calcium 9.1 8.3 - 10.6 mg/dL    Total Protein 7.6 6.4 - 8.2 g/dL    Alb 4.7 3.4 - 5.0 g/dL    Albumin/Globulin Ratio 1.6 1.1 - 2.2    Total Bilirubin 1.3 (H) 0.0 - 1.0 mg/dL    Alkaline Phosphatase 64 40 - 129 U/L    ALT 19 10 - 40 U/L    AST 19 15 - 37 U/L    Globulin 2.9 g/dL   Lipase   Result Value Ref Range    Lipase 19.0 13.0 - 60.0 U/L         RADIOLOGY:  All x-ray studies are viewed/reviewed by me. Formal interpretations per the radiologist are as follows:      CT ABDOMEN PELVIS W IV CONTRAST Additional Contrast? None   Final Result   Acute perforated sigmoid diverticulitis with free intraperitoneal air. No peridiverticular abscess. Other chronic findings as above. I discussed the findings regarding perforated diverticulitis by phone with   the ordering nurse practitioner, Mr. Fanta Muhammad, at 5:37 p.m. on 09/08/2020                 EKG:  See EKG interpretation by an attending physician. PROCEDURES:   N/A    CRITICAL CARE TIME:   Total critical care time today provided was at least 35 minutes. This excludes seperately billable procedure. Critical care time provided for acute perforated diverticulum that required close evaluation and/or intervention with concern for patient decompensation.      CONSULTS:  IP CONSULT TO GENERAL SURGERY      EMERGENCY DEPARTMENT COURSE andDIFFERENTIAL DIAGNOSIS/MDM:   Vitals:    Vitals:    09/08/20 1523 09/08/20 1739 09/08/20 1821   BP: (!) 142/95 139/83 (!) 139/99   Pulse: 88 78 86   Resp: 20 20 20   Temp: 98 °F (36.7 °C)     TempSrc: Oral     SpO2: 96% 94% 94%   Weight: 200 lb (90.7 kg)     Height: 5' 7\" (1.702 m)         Patient wasgiven the following medications:  Medications   droperidol (INAPSINE) injection 1.25 mg (has no administration in time range)   iopamidol (ISOVUE-370) 76 % injection 75 mL (75 mLs Intravenous Given 9/8/20 1709)   piperacillin-tazobactam (ZOSYN) 3.375 g in dextrose 5 % 50 mL IVPB (mini-bag) (0 g Intravenous Stopped 9/8/20 1818)         Patient was evaluated conjunction with Dr. Marcy Sandifer. Patient was referred to the ER by general surgery. Patient work-up today did show a leukocytosis at over 14,000, he had diffusely tender abdomen, CT scan did show perforated diverticulum with intraperitoneal free air. His vital signs are stable. He actually declined pain medication, I consulted general surgery and spoke with Dr. Anita Levy and we discussed patient findings, plan will be for this patient be admitted to the hospital for IV antibiotics. This was communicated to the patient who agrees with the plan. Patient was evaluated by the attending physician who also was in agreement. Patient laboratory studies, radiographic imaging, and assessment were all discussed with the patient and/orpatient family. There was shared decision-making between myself as well as the patient and/or their surrogate and we are all in agreement with admission. There was an opportunity for questions and all questions were answered tothe best of my ability and to the satisfaction of the patient and/or patient family. FINAL IMPRESSION:      1.  Diverticulitis of large intestine with perforation, unspecified bleeding status          DISPOSITION/PLAN:   DISPOSITION Admitted      PATIENT REFERRED TO:  Stacia Alpers, MD  [address] [address]  [phone]            DISCHARGE MEDICATIONS:  New Prescriptions    No medications on file                  (Please note thatportions of this note were completed with a voice recognition program.  Efforts were made to edit the dictations, but occasionally words are mis-transcribed.)    HONG Alejo - CNP-C (electronicallysigned)        HONG Alejo CNP  09/08/20 [facility] APRN - CNP  09/16/20 [id number]

## 2020-09-09 LAB
ANION GAP SERPL CALCULATED.3IONS-SCNC: 8 MMOL/L (ref 3–16)
BASOPHILS ABSOLUTE: 0.1 K/UL (ref 0–0.2)
BASOPHILS RELATIVE PERCENT: 0.5 %
BUN BLDV-MCNC: 12 MG/DL (ref 7–20)
CALCIUM SERPL-MCNC: 8.4 MG/DL (ref 8.3–10.6)
CHLORIDE BLD-SCNC: 105 MMOL/L (ref 99–110)
CO2: 25 MMOL/L (ref 21–32)
CREAT SERPL-MCNC: 0.9 MG/DL (ref 0.9–1.3)
EOSINOPHILS ABSOLUTE: 0.1 K/UL (ref 0–0.6)
EOSINOPHILS RELATIVE PERCENT: 1 %
GFR AFRICAN AMERICAN: >60
GFR NON-AFRICAN AMERICAN: >60
GLUCOSE BLD-MCNC: 105 MG/DL (ref 70–99)
HCT VFR BLD CALC: 43.2 % (ref 40.5–52.5)
HEMOGLOBIN: 14.9 G/DL (ref 13.5–17.5)
LYMPHOCYTES ABSOLUTE: 1.2 K/UL (ref 1–5.1)
LYMPHOCYTES RELATIVE PERCENT: 9.5 %
MCH RBC QN AUTO: 30 PG (ref 26–34)
MCHC RBC AUTO-ENTMCNC: 34.6 G/DL (ref 31–36)
MCV RBC AUTO: 86.8 FL (ref 80–100)
MONOCYTES ABSOLUTE: 1.1 K/UL (ref 0–1.3)
MONOCYTES RELATIVE PERCENT: 8.8 %
NEUTROPHILS ABSOLUTE: 9.8 K/UL (ref 1.7–7.7)
NEUTROPHILS RELATIVE PERCENT: 80.2 %
PDW BLD-RTO: 13.4 % (ref 12.4–15.4)
PLATELET # BLD: 184 K/UL (ref 135–450)
PMV BLD AUTO: 7.4 FL (ref 5–10.5)
POTASSIUM REFLEX MAGNESIUM: 3.8 MMOL/L (ref 3.5–5.1)
RBC # BLD: 4.97 M/UL (ref 4.2–5.9)
SODIUM BLD-SCNC: 138 MMOL/L (ref 136–145)
WBC # BLD: 12.2 K/UL (ref 4–11)

## 2020-09-09 PROCEDURE — 2580000003 HC RX 258: Performed by: SURGERY

## 2020-09-09 PROCEDURE — 99223 1ST HOSP IP/OBS HIGH 75: CPT | Performed by: SURGERY

## 2020-09-09 PROCEDURE — APPSS60 APP SPLIT SHARED TIME 46-60 MINUTES: Performed by: CLINICAL NURSE SPECIALIST

## 2020-09-09 PROCEDURE — 85025 COMPLETE CBC W/AUTO DIFF WBC: CPT

## 2020-09-09 PROCEDURE — 36415 COLL VENOUS BLD VENIPUNCTURE: CPT

## 2020-09-09 PROCEDURE — 80048 BASIC METABOLIC PNL TOTAL CA: CPT

## 2020-09-09 PROCEDURE — 6370000000 HC RX 637 (ALT 250 FOR IP): Performed by: SURGERY

## 2020-09-09 PROCEDURE — 6360000002 HC RX W HCPCS: Performed by: SURGERY

## 2020-09-09 PROCEDURE — 6360000002 HC RX W HCPCS: Performed by: CLINICAL NURSE SPECIALIST

## 2020-09-09 PROCEDURE — 2500000003 HC RX 250 WO HCPCS: Performed by: SURGERY

## 2020-09-09 PROCEDURE — 1200000000 HC SEMI PRIVATE

## 2020-09-09 RX ORDER — HYDROMORPHONE HCL 110MG/55ML
0.5 PATIENT CONTROLLED ANALGESIA SYRINGE INTRAVENOUS
Status: DISCONTINUED | OUTPATIENT
Start: 2020-09-09 | End: 2020-09-12 | Stop reason: HOSPADM

## 2020-09-09 RX ORDER — KETOROLAC TROMETHAMINE 30 MG/ML
15 INJECTION, SOLUTION INTRAMUSCULAR; INTRAVENOUS EVERY 6 HOURS
Status: COMPLETED | OUTPATIENT
Start: 2020-09-09 | End: 2020-09-12

## 2020-09-09 RX ADMIN — PIPERACILLIN AND TAZOBACTAM 3.38 G: 3; .375 INJECTION, POWDER, LYOPHILIZED, FOR SOLUTION INTRAVENOUS at 17:33

## 2020-09-09 RX ADMIN — FAMOTIDINE 20 MG: 20 TABLET, FILM COATED ORAL at 11:16

## 2020-09-09 RX ADMIN — SODIUM CHLORIDE: 900 INJECTION INTRAVENOUS at 06:26

## 2020-09-09 RX ADMIN — Medication 10 ML: at 21:15

## 2020-09-09 RX ADMIN — FAMOTIDINE 20 MG: 20 TABLET, FILM COATED ORAL at 21:14

## 2020-09-09 RX ADMIN — KETOROLAC TROMETHAMINE 15 MG: 30 INJECTION, SOLUTION INTRAMUSCULAR at 11:16

## 2020-09-09 RX ADMIN — METRONIDAZOLE 500 MG: 500 INJECTION, SOLUTION INTRAVENOUS at 06:25

## 2020-09-09 RX ADMIN — SODIUM CHLORIDE: 900 INJECTION INTRAVENOUS at 11:12

## 2020-09-09 RX ADMIN — KETOROLAC TROMETHAMINE 15 MG: 30 INJECTION, SOLUTION INTRAMUSCULAR at 21:14

## 2020-09-09 RX ADMIN — PIPERACILLIN AND TAZOBACTAM 3.38 G: 3; .375 INJECTION, POWDER, LYOPHILIZED, FOR SOLUTION INTRAVENOUS at 11:13

## 2020-09-09 RX ADMIN — PIPERACILLIN AND TAZOBACTAM 3.38 G: 3; .375 INJECTION, POWDER, LYOPHILIZED, FOR SOLUTION INTRAVENOUS at 01:16

## 2020-09-09 RX ADMIN — KETOROLAC TROMETHAMINE 15 MG: 30 INJECTION, SOLUTION INTRAMUSCULAR at 15:44

## 2020-09-09 RX ADMIN — METRONIDAZOLE 500 MG: 500 INJECTION, SOLUTION INTRAVENOUS at 15:45

## 2020-09-09 ASSESSMENT — PAIN SCALES - GENERAL
PAINLEVEL_OUTOF10: 5
PAINLEVEL_OUTOF10: 7
PAINLEVEL_OUTOF10: 6

## 2020-09-09 NOTE — CARE COORDINATION
CASE MANAGEMENT INITIAL ASSESSMENT      Reviewed chart and completed assessment with: patient . Explained Case Management role/services. Primary contact information:Bernadette Kenny[address]:   Who do you trust or have selected to make healthcare decisions for you? Name:   Nino Fang   Phone  Number: [phone]  Can this person be reached and be able to respond quickly, such as within a few minutes or hours? yes      Admit date/status: 9/8/20  Diagnosis: diverticulitis  Is this a Readmission?:n     Insurance: BCBS  Precert required for SNF - Y        3 night stay required - N    Living arrangements, Adls, care needs, prior to admission: lives in home with wife. Transportation: private    [address] at home: Walker__Cane__RTS__ BSC__Shower Chair__  02__ HHN__ CPAP__  BiPap__  Hospital Bed__ W/C___ Other__________    Services in the home and/or outpatient, prior to admission: none    Dialysis Facility (if applicable) none  · Name:  · Address:  · Dialysis Schedule:  · Phone:  · Fax:    PT/OT recs: none    Hospital Exemption Notification (HEN): needed for SNF    Barriers to discharge: none    Plan/comments: spoke with patient. Plans on returning home at discharge. Reports IPTA and drives. Denied any DCP needs. Please notify should needs arise.  Alison Velarde RN      ECOC on chart for MD signature

## 2020-09-09 NOTE — H&P
Department of General Surgery - Adult   History and Physical      PATIENT NAME: Ray Hall   YOB: 1975    ADMISSION DATE: 9/8/2020  4:28 PM      TODAY'S DATE: 9/9/2020    CHIEF COMPLAINT:  abd pain      HISTORY OF PRESENT ILLNESS:  The patient is a 54 y.o. male  who presented to the ED after being seen in the office yesterday. The pt endorses recurrent diffuse abdominal pain that became worse yesterday. He reports fever at home to 101, chills and associated nausea. He reports feeling okay until pain a few days ago. Reports increase in pain with movement. No diarrhea. The pt had episode of diverticulitis in June of this year requiring IV antibiotics and was discharged on PO antibiotics after sx improved. He did have colonoscopy in August that was unremarkable. Past Medical History:        Diagnosis Date    PONV (postoperative nausea and vomiting)        Past Surgical History:        Procedure Laterality Date    COLONOSCOPY N/A 8/14/2020    COLONOSCOPY DIAGNOSTIC performed by Ioana Sims MD at [address] [place] [address] ARTHROSCOPY Bilateral        Medications Prior to Admission:   Prior to Admission medications    Medication Sig Start Date End Date Taking? Authorizing Provider   psyllium (KONSYL) 28.3 % PACK Take 1 packet by mouth daily    Historical Provider, MD   Probiotic Product (PROBIOTIC-10 PO) Take by mouth    Historical Provider, MD       Allergies:  Patient has no known allergies. Social History:   TOBACCO:   reports that he has never smoked. His smokeless tobacco use includes chew. ETOH:   reports current alcohol use. DRUGS:   reports no history of drug use. MARITAL STATUS:        Family History:   History reviewed. No pertinent family history.     REVIEW OF SYSTEMS:    CONSTITUTIONAL:  positive for  fevers and chills  HEENT:  Negative  RESPIRATORY:  negative  CARDIOVASCULAR:  negative  GASTROINTESTINAL:  positive for nausea and abdominal pain  GENITOURINARY:  negative  HEMATOLOGIC/LYMPHATIC:  negative  ENDOCRINE:  Negative  NEUROLOGICAL:  Negative  * All other ROS reviewed and negative. PHYSICAL EXAM:    VITALS:  /72   Pulse 78   Temp 98.5 °F (36.9 °C) (Oral)   Resp 16   Ht 5' 7\" (1.702 m)   Wt 213 lb 3.2 oz (96.7 kg)   SpO2 94%   BMI 33.39 kg/m²   INTAKE/OUTPUT:   I/O last 3 completed shifts: In: 786 [I.V.:736; IV Piggyback:50]  Out: -   No intake/output data recorded. CONSTITUTIONAL:  awake, alert, no apparent distress and normal weight  ENT:  normocephalic, without obvious abnormality  NECK:  supple, symmetrical, trachea midline   LUNGS:  no crackles or wheezing  CARDIOVASCULAR:  regular rate and rhythm   ABDOMEN:   hypoactive bowel sounds, soft, non-distended, tenderness noted diffusely, voluntary guarding absent  MUSCULOSKELETAL:  0+ pitting edema lower extremities  NEUROLOGIC:  Mental Status Exam:  Level of Alertness:   awake  Orientation:   person, place, time  SKIN:  normal skin color, texture, turgor      DATA:  CBC:   Recent Labs     09/08/20  1529 09/09/20  0554   WBC 14.5* 12.2*   HGB 16.3 14.9   HCT 47.2 43.2    184     BMP:    Recent Labs     09/08/20  1529 09/09/20  0554    138   K 3.9 3.8    105   CO2 23 25   BUN 18 12   CREATININE 0.9 0.9   GLUCOSE 109* 105*     Hepatic:   Recent Labs     09/08/20  1529   AST 19   ALT 19   BILITOT 1.3*   ALKPHOS 64     Mag:    No results for input(s): MG in the last 72 hours. Phos:   No results for input(s): PHOS in the last 72 hours. INR: No results for input(s): INR in the last 72 hours. Radiology Review: Images personally reviewed by me. EXAMINATION:   CT OF THE ABDOMEN AND PELVIS WITH CONTRAST 9/8/2020 5:08 pm     TECHNIQUE:   CT of the abdomen and pelvis was performed with the administration of   intravenous contrast. Multiplanar reformatted images are provided for review.    Dose modulation, iterative reconstruction, and/or weight based adjustment of   the mA/kV was utilized to reduce the radiation dose to as low as reasonably   achievable. COMPARISON:   06/09/2020. HISTORY:   ORDERING SYSTEM PROVIDED HISTORY: abd pain, hx of diverticulitis   TECHNOLOGIST PROVIDED HISTORY:   Reason for exam:->abd pain, hx of diverticulitis   Additional Contrast?->None   Reason for Exam: abd pain, hx of diverticulitis   Acuity: Acute   Type of Exam: Initial     FINDINGS:   Minimal bibasilar dependent atelectasis.  Unremarkable liver, spleen,   adrenals, pancreas, and bilateral kidneys.  No definite cholelithiasis or   biliary dilatation.  Normal appendix.  No evidence of bowel obstruction. Distal colonic diverticulosis.  Marked fat stranding around the mid sigmoid   colon surrounded by extraluminal air bubbles.  Also, small to moderate amount   of free intraperitoneal air in the mid to upper abdomen.  No peridiverticular   abscess.  Vascular calcification with no abdominal aortic aneurysm.  No   adenopathy.  Grossly intact urinary bladder.  Enlarged prostate gland.  Fat   containing right inguinal hernia.  Bilateral pars defects of L5 with grade 2   spondylolisthesis of L5 on S1.  Multilevel thoracolumbar spondylosis.  Mild   osteoarthritis of the bilateral hip joints. Impression:      Acute perforated sigmoid diverticulitis with free intraperitoneal air. No peridiverticular abscess. Other chronic findings as above. I discussed the findings regarding perforated diverticulitis by phone with   the ordering nurse practitioner         ASSESSMENT AND PLAN:  Recurrent acute perforated diverticulitis without abscess:   Pt admitted for IV antibiotics, GI rest, IVF. May consider repeat CT scan in few days to reassess depending on clinical course. Given recurrent nature in relatively short period of time, will discuss elective colon resection if he gets over this episode, and does not require any urgent surgical intervention.      Pain: will add scheduled toradol and change to dilaudid PRN. Discussed with pt and his wife at bedside. PRACTITIONER CERTIFICATION   I certify that Donnie Irene is expected to be hospitalized for > 2 days based on the above assessment and plan. Electronically signed by Priscila Sifuentes APRN - [address] [facility]  [id number]    Patient seen and agree with above. Pain improved today. Mild nausea. Some low grade fevers. No bms. Continue ice chips only today  IV abx and IV pain meds if needed.   Repeat labs tomorrow    Calos Shelton MD

## 2020-09-09 NOTE — PLAN OF CARE
4 Eyes Skin Assessment     The patient is being assess for  Admission    I agree that 2 RN's have performed a thorough Head to Toe Skin Assessment on the patient. ALL assessment sites listed below have been assessed. Areas assessed by both nurses: Renea Ulloa RN and Lázaro Aguirre RN  [x]   Head, Face, and Ears   [x]   Shoulders, Back, and Chest  [x]   Arms, Elbows, and Hands   [x]   Coccyx, Sacrum, and Ischum  [x]   Legs, Feet, and Heels        Does the Patient have Skin Breakdown?   No         Fahad Prevention initiated:  NA   Wound Care Orders initiated:  NA      WOC nurse consulted for Pressure Injury (Stage 3,4, Unstageable, DTI, NWPT, and Complex wounds):  NA      Nurse 1 eSignature: Electronically signed by Eris Bartlett RN on 9/8/20 at 9:40 PM EDT    **SHARE this note so that the co-signing nurse is able to place an eSignature**    Nurse 2 eSignature: {Esignature:[id number]:::0}

## 2020-09-09 NOTE — PROGRESS NOTES
Pt admitted to room 341 from ED. Pt oriented to room, call light, and telephone. Pt denies further needs or questions. Vital signs are stable. Pt is alert and oriented x 4. Assesment is as charted.

## 2020-09-10 LAB
HCT VFR BLD CALC: 39.9 % (ref 40.5–52.5)
HEMOGLOBIN: 13.6 G/DL (ref 13.5–17.5)
MCH RBC QN AUTO: 29.7 PG (ref 26–34)
MCHC RBC AUTO-ENTMCNC: 34 G/DL (ref 31–36)
MCV RBC AUTO: 87.3 FL (ref 80–100)
PDW BLD-RTO: 13.5 % (ref 12.4–15.4)
PLATELET # BLD: 182 K/UL (ref 135–450)
PMV BLD AUTO: 6.9 FL (ref 5–10.5)
RBC # BLD: 4.57 M/UL (ref 4.2–5.9)
WBC # BLD: 11.8 K/UL (ref 4–11)

## 2020-09-10 PROCEDURE — 6370000000 HC RX 637 (ALT 250 FOR IP): Performed by: SURGERY

## 2020-09-10 PROCEDURE — 36415 COLL VENOUS BLD VENIPUNCTURE: CPT

## 2020-09-10 PROCEDURE — APPSS45 APP SPLIT SHARED TIME 31-45 MINUTES: Performed by: CLINICAL NURSE SPECIALIST

## 2020-09-10 PROCEDURE — 6360000002 HC RX W HCPCS: Performed by: SURGERY

## 2020-09-10 PROCEDURE — 1200000000 HC SEMI PRIVATE

## 2020-09-10 PROCEDURE — 6360000002 HC RX W HCPCS: Performed by: CLINICAL NURSE SPECIALIST

## 2020-09-10 PROCEDURE — 99232 SBSQ HOSP IP/OBS MODERATE 35: CPT | Performed by: SURGERY

## 2020-09-10 PROCEDURE — 2580000003 HC RX 258: Performed by: SURGERY

## 2020-09-10 PROCEDURE — 85027 COMPLETE CBC AUTOMATED: CPT

## 2020-09-10 RX ADMIN — PIPERACILLIN AND TAZOBACTAM 3.38 G: 3; .375 INJECTION, POWDER, LYOPHILIZED, FOR SOLUTION INTRAVENOUS at 11:14

## 2020-09-10 RX ADMIN — PIPERACILLIN AND TAZOBACTAM 3.38 G: 3; .375 INJECTION, POWDER, LYOPHILIZED, FOR SOLUTION INTRAVENOUS at 17:32

## 2020-09-10 RX ADMIN — KETOROLAC TROMETHAMINE 15 MG: 30 INJECTION, SOLUTION INTRAMUSCULAR at 22:08

## 2020-09-10 RX ADMIN — SODIUM CHLORIDE: 900 INJECTION INTRAVENOUS at 11:08

## 2020-09-10 RX ADMIN — FAMOTIDINE 20 MG: 20 TABLET, FILM COATED ORAL at 11:10

## 2020-09-10 RX ADMIN — PIPERACILLIN AND TAZOBACTAM 3.38 G: 3; .375 INJECTION, POWDER, LYOPHILIZED, FOR SOLUTION INTRAVENOUS at 02:05

## 2020-09-10 RX ADMIN — KETOROLAC TROMETHAMINE 15 MG: 30 INJECTION, SOLUTION INTRAMUSCULAR at 11:10

## 2020-09-10 RX ADMIN — KETOROLAC TROMETHAMINE 15 MG: 30 INJECTION, SOLUTION INTRAMUSCULAR at 17:00

## 2020-09-10 RX ADMIN — FAMOTIDINE 20 MG: 20 TABLET, FILM COATED ORAL at 22:07

## 2020-09-10 RX ADMIN — SODIUM CHLORIDE: 900 INJECTION INTRAVENOUS at 22:10

## 2020-09-10 RX ADMIN — KETOROLAC TROMETHAMINE 15 MG: 30 INJECTION, SOLUTION INTRAMUSCULAR at 03:30

## 2020-09-10 ASSESSMENT — PAIN DESCRIPTION - LOCATION: LOCATION: ABDOMEN

## 2020-09-10 ASSESSMENT — PAIN SCALES - GENERAL
PAINLEVEL_OUTOF10: 4
PAINLEVEL_OUTOF10: 5
PAINLEVEL_OUTOF10: 7
PAINLEVEL_OUTOF10: 5

## 2020-09-10 ASSESSMENT — PAIN DESCRIPTION - PAIN TYPE: TYPE: ACUTE PAIN

## 2020-09-10 ASSESSMENT — PAIN DESCRIPTION - ORIENTATION: ORIENTATION: MID

## 2020-09-10 NOTE — PROGRESS NOTES
Pt refused Lovenox injection. Pt was educated on the risks of not taking anticoagulant.  Electronically signed by Shailesh Hope RN on 9/10/2020 at 3:41 PM

## 2020-09-10 NOTE — PROGRESS NOTES
Pt A&O x4. VSS. Deies dypnea. Bowel sounds active in all quadrants. Denies any need at this time. Pt refused Lovenox injection. Pt was educated on the risks of not taking anticoagulant. CALL LIGHT & BEDSIDE TABLE WITHIN REACH, WHEELS LOCKED, BED IN LOWEST POSITION, PT INSTRUCTED TO CALL OUT FOR ASSISTANCE & EXPRESSED UNDERSTANDING, CALLS OUT APPROPRIATELY. Will continue to monitor.  Electronically signed by Radha Diaz RN on 9/10/2020 at 3:45 PM

## 2020-09-10 NOTE — FLOWSHEET NOTE
Pt A&Ox4. VSS. Shift assessment completed. Toradol administered for pain per orders. Tolerates well. Denies other needs. Call light within reach, bed in lowest position, wheels locked. Will monitor.

## 2020-09-10 NOTE — CARE COORDINATION
Chart reviewed. Recurrent acute perforated diverticulitis without abscess. Patient started on clear liquids today. Will need return of GI function prior to d/c. May need colon resection at some time. Patient from home with wife. YANNICKA.  Carmen [place], RN

## 2020-09-11 ENCOUNTER — APPOINTMENT (OUTPATIENT)
Dept: CT IMAGING | Age: 45
DRG: 392 | End: 2020-09-11
Payer: COMMERCIAL

## 2020-09-11 LAB
HCT VFR BLD CALC: 38.4 % (ref 40.5–52.5)
HEMOGLOBIN: 13.2 G/DL (ref 13.5–17.5)
MCH RBC QN AUTO: 30.4 PG (ref 26–34)
MCHC RBC AUTO-ENTMCNC: 34.4 G/DL (ref 31–36)
MCV RBC AUTO: 88.3 FL (ref 80–100)
PDW BLD-RTO: 13.1 % (ref 12.4–15.4)
PLATELET # BLD: 191 K/UL (ref 135–450)
PMV BLD AUTO: 7 FL (ref 5–10.5)
RBC # BLD: 4.35 M/UL (ref 4.2–5.9)
WBC # BLD: 7.5 K/UL (ref 4–11)

## 2020-09-11 PROCEDURE — 85027 COMPLETE CBC AUTOMATED: CPT

## 2020-09-11 PROCEDURE — 6360000004 HC RX CONTRAST MEDICATION: Performed by: SURGERY

## 2020-09-11 PROCEDURE — 99232 SBSQ HOSP IP/OBS MODERATE 35: CPT | Performed by: SURGERY

## 2020-09-11 PROCEDURE — 6360000002 HC RX W HCPCS: Performed by: CLINICAL NURSE SPECIALIST

## 2020-09-11 PROCEDURE — 74177 CT ABD & PELVIS W/CONTRAST: CPT

## 2020-09-11 PROCEDURE — 6360000002 HC RX W HCPCS: Performed by: SURGERY

## 2020-09-11 PROCEDURE — 6370000000 HC RX 637 (ALT 250 FOR IP): Performed by: SURGERY

## 2020-09-11 PROCEDURE — 2580000003 HC RX 258: Performed by: SURGERY

## 2020-09-11 PROCEDURE — 1200000000 HC SEMI PRIVATE

## 2020-09-11 PROCEDURE — 36415 COLL VENOUS BLD VENIPUNCTURE: CPT

## 2020-09-11 RX ADMIN — IOPAMIDOL 75 ML: 755 INJECTION, SOLUTION INTRAVENOUS at 13:24

## 2020-09-11 RX ADMIN — PIPERACILLIN AND TAZOBACTAM 3.38 G: 3; .375 INJECTION, POWDER, LYOPHILIZED, FOR SOLUTION INTRAVENOUS at 09:15

## 2020-09-11 RX ADMIN — KETOROLAC TROMETHAMINE 15 MG: 30 INJECTION, SOLUTION INTRAMUSCULAR at 21:45

## 2020-09-11 RX ADMIN — FAMOTIDINE 20 MG: 20 TABLET, FILM COATED ORAL at 20:28

## 2020-09-11 RX ADMIN — KETOROLAC TROMETHAMINE 15 MG: 30 INJECTION, SOLUTION INTRAMUSCULAR at 03:02

## 2020-09-11 RX ADMIN — PIPERACILLIN AND TAZOBACTAM 3.38 G: 3; .375 INJECTION, POWDER, LYOPHILIZED, FOR SOLUTION INTRAVENOUS at 03:06

## 2020-09-11 RX ADMIN — SODIUM CHLORIDE: 900 INJECTION INTRAVENOUS at 09:16

## 2020-09-11 RX ADMIN — KETOROLAC TROMETHAMINE 15 MG: 30 INJECTION, SOLUTION INTRAMUSCULAR at 17:09

## 2020-09-11 RX ADMIN — KETOROLAC TROMETHAMINE 15 MG: 30 INJECTION, SOLUTION INTRAMUSCULAR at 09:15

## 2020-09-11 RX ADMIN — SODIUM CHLORIDE: 900 INJECTION INTRAVENOUS at 20:28

## 2020-09-11 RX ADMIN — PIPERACILLIN AND TAZOBACTAM 3.38 G: 3; .375 INJECTION, POWDER, LYOPHILIZED, FOR SOLUTION INTRAVENOUS at 17:09

## 2020-09-11 RX ADMIN — FAMOTIDINE 20 MG: 20 TABLET, FILM COATED ORAL at 09:14

## 2020-09-11 RX ADMIN — IOHEXOL 50 ML: 240 INJECTION, SOLUTION INTRATHECAL; INTRAVASCULAR; INTRAVENOUS; ORAL at 09:40

## 2020-09-11 ASSESSMENT — PAIN SCALES - GENERAL
PAINLEVEL_OUTOF10: 5
PAINLEVEL_OUTOF10: 5
PAINLEVEL_OUTOF10: 6
PAINLEVEL_OUTOF10: 4

## 2020-09-11 NOTE — PROGRESS NOTES
Pt assessment completed and charted. VSS. Pt a/o. Pt informed about CT. Pt given OMNIPAQUE prior. Pt tolerated well. Bed in lowest position and wheels locked. Call light within reach. Bedside table within reach. Non-skid footwear in place. Pt denies any other needs at this time. Pt calls out appropriately. Will continue to monitor.

## 2020-09-11 NOTE — PROGRESS NOTES
[facility] SURGERY    PATIENT NAME: Jamee Ibarra     TODAY'S DATE: 9/11/2020    CHIEF COMPLAINT: hungry    INTERVAL HISTORY/HPI:    Pt still w/ lower abdominal pain but much improved, is hungry, had BM. OBJECTIVE:  VITALS:  /82   Pulse 72   Temp 97.9 °F (36.6 °C) (Oral)   Resp 16   Ht 5' 7\" (1.702 m)   Wt 213 lb 3.2 oz (96.7 kg)   SpO2 95%   BMI 33.39 kg/m²     INTAKE/OUTPUT:    I/O last 3 completed shifts: In: 3113 [P.O.:1080; I.V.:2033]  Out: -   No intake/output data recorded. CONSTITUTIONAL:  awake and alert  LUNGS:     ABDOMEN:   , soft, non-distended, tenderness noted in the left lower quadrant and is minimal, no rebound     Data:  CBC:   Recent Labs     09/09/20  0554 09/10/20  0656 09/11/20  0614   WBC 12.2* 11.8* 7.5   HGB 14.9 13.6 13.2*   HCT 43.2 39.9* 38.4*    182 191     BMP:    Recent Labs     09/08/20  1529 09/09/20  0554    138   K 3.9 3.8    105   CO2 23 25   BUN 18 12   CREATININE 0.9 0.9   GLUCOSE 109* 105*     Hepatic:   Recent Labs     09/08/20  1529   AST 19   ALT 19   BILITOT 1.3*   ALKPHOS 64     Mag:    No results for input(s): MG in the last 72 hours. Phos:   No results for input(s): PHOS in the last 72 hours. INR: No results for input(s): INR in the last 72 hours.     Radiology Review:         ASSESSMENT AND PLAN:  Acute diverticulitis re-exacerbation, improving rapidly with IV abx, bowel rest   - cont IV abx   - repeat CT scheduled for today; if looks improved, will begin to advance diet   - likley d/c home in 1-2 days if tolerates PO diet   - will need to f/u in office in a few weeks to discuss elective sigmoidectomy    Electronically signed by Chuy Hoskins MD

## 2020-09-11 NOTE — PLAN OF CARE
Problem: Pain:  Goal: Pain level will decrease  Description: Pain level will decrease  Outcome: Ongoing     Pt rated pain 5/10 on pain scale. Pt given scheduled Toradol. Bedside table and call light within reach. Will continue to monitor.

## 2020-09-11 NOTE — PROGRESS NOTES
Pt alert and oriented. VSS. Assessment completed as charted. Pt c/o abdominal pain 5/10, scheduled Toradol given per MAR. Pt states having BMs. Denies nausea or further needs at present time. Resting in bed. Call light and bedside table within reach. Bed locked and in lowest position. Will continue to monitor.

## 2020-09-12 VITALS
RESPIRATION RATE: 16 BRPM | TEMPERATURE: 97.9 F | WEIGHT: 213.2 LBS | HEART RATE: 63 BPM | HEIGHT: 67 IN | OXYGEN SATURATION: 98 % | BODY MASS INDEX: 33.46 KG/M2 | DIASTOLIC BLOOD PRESSURE: 84 MMHG | SYSTOLIC BLOOD PRESSURE: 142 MMHG

## 2020-09-12 LAB
A/G RATIO: 1.5 (ref 1.1–2.2)
ALBUMIN SERPL-MCNC: 4.1 G/DL (ref 3.4–5)
ALP BLD-CCNC: 49 U/L (ref 40–129)
ALT SERPL-CCNC: 12 U/L (ref 10–40)
ANION GAP SERPL CALCULATED.3IONS-SCNC: 11 MMOL/L (ref 3–16)
AST SERPL-CCNC: 13 U/L (ref 15–37)
BASOPHILS ABSOLUTE: 0.2 K/UL (ref 0–0.2)
BASOPHILS RELATIVE PERCENT: 2.5 %
BILIRUB SERPL-MCNC: 0.4 MG/DL (ref 0–1)
BUN BLDV-MCNC: 7 MG/DL (ref 7–20)
CALCIUM SERPL-MCNC: 8.9 MG/DL (ref 8.3–10.6)
CHLORIDE BLD-SCNC: 104 MMOL/L (ref 99–110)
CO2: 25 MMOL/L (ref 21–32)
CREAT SERPL-MCNC: 0.8 MG/DL (ref 0.9–1.3)
EOSINOPHILS ABSOLUTE: 0.3 K/UL (ref 0–0.6)
EOSINOPHILS RELATIVE PERCENT: 4.9 %
GFR AFRICAN AMERICAN: >60
GFR NON-AFRICAN AMERICAN: >60
GLOBULIN: 2.8 G/DL
GLUCOSE BLD-MCNC: 121 MG/DL (ref 70–99)
HCT VFR BLD CALC: 40 % (ref 40.5–52.5)
HEMOGLOBIN: 14.1 G/DL (ref 13.5–17.5)
LYMPHOCYTES ABSOLUTE: 1.3 K/UL (ref 1–5.1)
LYMPHOCYTES RELATIVE PERCENT: 20.2 %
MCH RBC QN AUTO: 30.5 PG (ref 26–34)
MCHC RBC AUTO-ENTMCNC: 35.2 G/DL (ref 31–36)
MCV RBC AUTO: 86.6 FL (ref 80–100)
MONOCYTES ABSOLUTE: 0.8 K/UL (ref 0–1.3)
MONOCYTES RELATIVE PERCENT: 11.7 %
NEUTROPHILS ABSOLUTE: 3.9 K/UL (ref 1.7–7.7)
NEUTROPHILS RELATIVE PERCENT: 60.7 %
PDW BLD-RTO: 13.3 % (ref 12.4–15.4)
PLATELET # BLD: 245 K/UL (ref 135–450)
PMV BLD AUTO: 6.7 FL (ref 5–10.5)
POTASSIUM REFLEX MAGNESIUM: 3.8 MMOL/L (ref 3.5–5.1)
RBC # BLD: 4.62 M/UL (ref 4.2–5.9)
SODIUM BLD-SCNC: 140 MMOL/L (ref 136–145)
TOTAL PROTEIN: 6.9 G/DL (ref 6.4–8.2)
WBC # BLD: 6.5 K/UL (ref 4–11)

## 2020-09-12 PROCEDURE — 99238 HOSP IP/OBS DSCHRG MGMT 30/<: CPT | Performed by: SURGERY

## 2020-09-12 PROCEDURE — 80053 COMPREHEN METABOLIC PANEL: CPT

## 2020-09-12 PROCEDURE — 2580000003 HC RX 258: Performed by: SURGERY

## 2020-09-12 PROCEDURE — 6370000000 HC RX 637 (ALT 250 FOR IP): Performed by: SURGERY

## 2020-09-12 PROCEDURE — 85025 COMPLETE CBC W/AUTO DIFF WBC: CPT

## 2020-09-12 PROCEDURE — 6360000002 HC RX W HCPCS: Performed by: SURGERY

## 2020-09-12 PROCEDURE — 6360000002 HC RX W HCPCS: Performed by: CLINICAL NURSE SPECIALIST

## 2020-09-12 PROCEDURE — 36415 COLL VENOUS BLD VENIPUNCTURE: CPT

## 2020-09-12 RX ORDER — AMOXICILLIN AND CLAVULANATE POTASSIUM 875; 125 MG/1; MG/1
1 TABLET, FILM COATED ORAL 2 TIMES DAILY
Qty: 20 TABLET | Refills: 0 | Status: SHIPPED | OUTPATIENT
Start: 2020-09-12 | End: 2020-09-22 | Stop reason: SDUPTHER

## 2020-09-12 RX ADMIN — FAMOTIDINE 20 MG: 20 TABLET, FILM COATED ORAL at 08:11

## 2020-09-12 RX ADMIN — PIPERACILLIN AND TAZOBACTAM 3.38 G: 3; .375 INJECTION, POWDER, LYOPHILIZED, FOR SOLUTION INTRAVENOUS at 01:48

## 2020-09-12 RX ADMIN — KETOROLAC TROMETHAMINE 15 MG: 30 INJECTION, SOLUTION INTRAMUSCULAR at 03:34

## 2020-09-12 RX ADMIN — PIPERACILLIN AND TAZOBACTAM 3.38 G: 3; .375 INJECTION, POWDER, LYOPHILIZED, FOR SOLUTION INTRAVENOUS at 10:32

## 2020-09-12 ASSESSMENT — PAIN SCALES - GENERAL: PAINLEVEL_OUTOF10: 5

## 2020-09-12 NOTE — PROGRESS NOTES
Patient tolerated lunch. IV removed. Discharge instructions given. Patient taken to vehicle with belongings.

## 2020-09-12 NOTE — PROGRESS NOTES
Shift assessment updated as charted. Patient a/ox4. VSS. Patient denies abd. . pain at this time. Waiting on full liquid breakfast. Denies needs. Will reassess.

## 2020-09-12 NOTE — PROGRESS NOTES
Assessment complete and charted. Pt denies needs at this time. Pain controlled with scheduled toradol. Pt tolerating clear liquid diet. Call light within reach, will continue to monitor.

## 2020-09-12 NOTE — PLAN OF CARE
Pt voices controlled pain with scheduled toradol. Denies increased pain with clear liquid intake. Call light within reach, will continue to monitor.

## 2020-09-12 NOTE — DISCHARGE SUMMARY
Surgery Discharge Summary    Patient Identification  Agustín Don is a 54 y.o. male. :  1975  Admit Date:  2020    Discharge date:   2020  1:13 PM                                   Disposition: home    Discharge Diagnoses:   Principal Problem:    Acute diverticulitis  Active Problems:    Diverticulitis of large intestine with perforation  Resolved Problems:    * No resolved hospital problems. *      Discharge condition: good    Discharge Medications:     Discharge Medication List as of 2020 12:52 PM      CONTINUE these medications which have NOT CHANGED    Details   Probiotic Product (PROBIOTIC-10 PO) Take by mouthHistorical Med                Discharge Medication List as of 2020 12:52 PM      START taking these medications    Details   amoxicillin-clavulanate (AUGMENTIN) 875-125 MG per tablet Take 1 tablet by mouth 2 times daily for 10 days, Disp-20 tablet,R-0Print               Most Recent Labs:    CBC:   Recent Labs     09/10/20  0656 20  0614 20  1233   WBC 11.8* 7.5 6.5   HGB 13.6 13.2* 14.1   HCT 39.9* 38.4* 40.0*    191 245     BMP:    Recent Labs     20  1234      K 3.8      CO2 25   BUN 7   CREATININE 0.8*   GLUCOSE 121*     Hepatic:   Recent Labs     20  1234   AST 13*   ALT 12   BILITOT 0.4   ALKPHOS 49     PT/INR:  No results for input(s): INR in the last 72 hours. Consults: none    Surgery: none    Patient Instructions: Activity: no restrictions  Diet: low fiber  Follow-up with [facility] in 7-10 days. The patient and/or family/patient representatives, were provided education regarding discharge instructions, ongoing treatment and follow-up. Details of information given to the patient may be found in the discharge instructions located in the EMR. HPI and Hospital Course:   Patient admitted with diverticulitis. Treated with bowl reset and iv abx.   As symptoms and labs/imaging improved he tolerated diet advancement and was discharged home on oral abx.       [facility]

## 2020-09-14 ENCOUNTER — TELEPHONE (OUTPATIENT)
Dept: SURGERY | Age: 45
End: 2020-09-14

## 2020-09-14 RX ORDER — OXYCODONE HYDROCHLORIDE AND ACETAMINOPHEN 5; 325 MG/1; MG/1
1 TABLET ORAL EVERY 6 HOURS PRN
Qty: 12 TABLET | Refills: 0 | Status: SHIPPED | OUTPATIENT
Start: 2020-09-14 | End: 2020-09-17

## 2020-09-14 NOTE — ADT AUTH CERT
Diverticulitis, Acute - Care Day 4 (9/11/2020) by Sae Manjarrez, RN         Review Status  Review Entered    Completed  9/14/2020 10:45        Criteria Review       Care Day: 4 Care Date: 9/11/2020 Level of Care:    Guideline Day 3    Clinical Status    (X) * Hemodynamic stability    9/14/2020 10:45 AM EDT by Minnie Bales      9/11  82  92396/19  97%ra    (X) * Pain absent or managed    9/14/2020 10:45 AM EDT by Minnie Bales      rates pain 6/10    (X) * Stable Hgb/Hct    ( ) * No evidence of peritonitis or abscess    (X) * Surgery not indicated    (X) * Diet tolerated    9/14/2020 10:45 AM EDT by Minnie Bales      full liquids    (X) * Afebrile or fever improved    9/14/2020 10:45 AM EDT by Minnie Bales      98.0    ( ) * Discharge plans and education understood    Activity    (X) * Ambulatory or acceptable for next level of care    Routes    ( ) * Oral hydration, medications, [E] and diet    9/14/2020 10:45 AM EDT by Minnie Bales      tordol 15mg q6hrs, pepcid 20mg bid    ivf @ 75 cc/hr    Interventions    (X) CBC    9/14/2020 10:45 AM EDT by Minnie Bales      wbc 7.5  hgb  13.2    Medications    (X) Possible parenteral or oral antibiotics    9/14/2020 10:45 AM EDT by Melania Eddy zosyn 3.375g iv q8hrs    * Milestone    Additional Notes    9/11    Per General surg:    Pt still w/ lower abdominal pain but much improved, is hungry, had BM    Acute diverticulitis re-exacerbation, improving rapidly with IV abx, bowel rest     - cont IV abx q8hrs     - repeat CT scheduled for today; if looks improved, will begin to advance diet     - likley d/c home in 1-2 days if tolerates PO diet     - will need to f/u in office in a few weeks to discuss elective sigmoidectomy

## 2020-09-14 NOTE — TELEPHONE ENCOUNTER
Upon hospital discharge, Dr. Solis Peers advised if he was still having pain, to call the office for medication.

## 2020-09-22 ENCOUNTER — OFFICE VISIT (OUTPATIENT)
Dept: SURGERY | Age: 45
End: 2020-09-22
Payer: COMMERCIAL

## 2020-09-22 VITALS
DIASTOLIC BLOOD PRESSURE: 90 MMHG | BODY MASS INDEX: 32.99 KG/M2 | SYSTOLIC BLOOD PRESSURE: 129 MMHG | HEIGHT: 67 IN | WEIGHT: 210.2 LBS | TEMPERATURE: 97.3 F | HEART RATE: 84 BPM

## 2020-09-22 PROCEDURE — 99213 OFFICE O/P EST LOW 20 MIN: CPT | Performed by: SURGERY

## 2020-09-22 RX ORDER — SODIUM CHLORIDE 0.9 % (FLUSH) 0.9 %
10 SYRINGE (ML) INJECTION EVERY 12 HOURS SCHEDULED
Status: CANCELLED | OUTPATIENT
Start: 2020-09-22

## 2020-09-22 RX ORDER — SODIUM CHLORIDE 0.9 % (FLUSH) 0.9 %
10 SYRINGE (ML) INJECTION PRN
Status: CANCELLED | OUTPATIENT
Start: 2020-09-22

## 2020-09-22 RX ORDER — AMOXICILLIN AND CLAVULANATE POTASSIUM 875; 125 MG/1; MG/1
1 TABLET, FILM COATED ORAL 2 TIMES DAILY
Qty: 14 TABLET | Refills: 0 | Status: SHIPPED | OUTPATIENT
Start: 2020-09-22 | End: 2020-09-29

## 2020-09-23 ENCOUNTER — TELEPHONE (OUTPATIENT)
Dept: SURGERY | Age: 45
End: 2020-09-23

## 2020-09-23 NOTE — PATIENT INSTRUCTIONS
[address] [address]  Phone: [phone]  Fax: [phone] will be scheduled for surgery with Dr. Phoebe Angela. · The office will call you with the date and time that surgery is scheduled. · Please take note of these instructions for surgery:  · You should have nothing by mouth after midnight the night before your surgery - this includes no food or water. · Your surgery will be cancelled if you have taken anything by mouth after midnight, NO exceptions. · You will need to have a history and physical prior to your surgery. This will need to be completed up to 30 days before your surgery. This H/P can be completed by your family doctor or the hospital.   · IF you take coumadin (warfarin), please stop taking this medication 5 days prior to your surgery. · IF you take plavix, please stop taking this 7 days prior to your surgery. · Please contact our office if you have a pacemaker or defibrillator. · IF you are allergic to latex, please tell our office prior to your surgery. This is important in know before scheduling your surgery. · IF you are having an out patient surgery, you will need someone available to drive you home after your surgery, and to also stay with you for the rest of the day. · IF you are having a surgery requiring an inpatient stay in the hospital, you will need someone to drive you home upon discharge from the hospital.  · Please contact Dr. Rozena Cooks assistant Vilma Matos if you have any questions or concerns. · Please call the office with any changes in your symptoms or further questions/concerns.  [phone]

## 2020-09-23 NOTE — TELEPHONE ENCOUNTER
Pt saw Dr Juve Nair in the office 9/22/20 and was given surgery instructions w/ bowel prep for a Robotic Sigmoidectomy, Possible Open Procedure (General Anes) scheduled 10/28/20 @ 11am arrival 9am [facility] - NPO after midnight - Pt instructed to get COVID tested on 10/23/20 @ 8:30am @ [address] [facility] - Dr Ken Roldan to complet pre-op physical (H&P form given to pt @ OV) Went over bowel prep w/ pt for day before surgery - Pt was also given a RTW letter for 9/24/20 up until his surgery scheduled in Oct - Pt understood and agreed to above noted

## 2020-09-30 ENCOUNTER — TELEPHONE (OUTPATIENT)
Dept: GASTROENTEROLOGY | Age: 45
End: 2020-09-30

## 2020-10-21 NOTE — PROGRESS NOTES
Obstructive Sleep Apnea (ISAIAS) Screening     Patient:  Jayleen James    YOB: 1975      Medical Record #:  [id number]                     Date:  10/21/2020     1. Are you a loud and/or regular snorer? []  Yes       [x] No    2. Have you been observed to gasp or stop breathing during sleep? []  Yes       [x] No    3. Do you feel tired or groggy upon awakening or do you awaken with a headache?           []  Yes       [x] No    4. Are you often tired or fatigued during the wake time hours? []  Yes       [x] No    5. Do you fall asleep sitting, reading, watching TV or driving? []  Yes       [x] No    6. Do you often have problems with memory or concentration? []  Yes       [x] No    **If patient's score is ? 3 they are considered high risk for ISAIAS. An Anesthesia provider will evaluate the patient and develop a plan of care the day of surgery. Note:  If the patient's BMI is more than 35 kg m¯² , has neck circumference > 40 cm, and/or high blood pressure the risk is greater (© American Sleep Apnea Association, 2006).

## 2020-10-21 NOTE — PROGRESS NOTES
Preoperative Screening for Elective Surgery/Invasive Procedures While COVID-19 present in the community     Have you had any of the following symptoms? NONE  o Fever, chills  o Cough  o Shortness of breath  o Muscle aches/pain  o Diarrhea  o Abdominal pain, nausea, vomiting  o Loss or decrease in taste and / or smell   Risk of Exposure  o Have you recently been hospitalized for COVID-19 or flu-like illness, if so when? NO  o Recently diagnosed with COVID-19, if so when? NO  o Recently tested for COVID-19, if so when? NO  o Have you been in close contact with a person or family member who currently has or recently had COVID-23? If yes, when and in what context? NO  o Do you live with anybody who in the last 14 days has had fever, chills, shortness of breath, muscle aches, flu-like illness? NO  o Do you have any close contacts or family members who are currently in the hospital for COVID-19 or flu-like illness? If yes, assess recent close contact with this person. NO    Indicate if the patient has a positive screen by answering yes to one or more of the above questions. Patients who test positive or screen positive prior to surgery or on the day of surgery should be evaluated in conjunction with the surgeon/proceduralist/anesthesiologist to determine the urgency of the procedure.

## 2020-10-23 ENCOUNTER — OFFICE VISIT (OUTPATIENT)
Dept: PRIMARY CARE CLINIC | Age: 45
End: 2020-10-23
Payer: COMMERCIAL

## 2020-10-23 PROCEDURE — 99211 OFF/OP EST MAY X REQ PHY/QHP: CPT | Performed by: NURSE PRACTITIONER

## 2020-10-23 NOTE — PATIENT INSTRUCTIONS
Advance Care Planning  People with COVID-19 may have no symptoms, mild symptoms, such as fever, cough, and shortness of breath or they may have more severe illness, developing severe and fatal pneumonia. As a result, Advance Care Planning with attention to naming a health care decision maker (someone you trust to make healthcare decisions for you if you could not speak for yourself) and sharing other health care preferences is important BEFORE a possible health crisis. Please contact your Primary Care Provider to discuss Advance Care Planning. Preventing the Spread of Coronavirus Disease 2019 in Homes and Residential Communities  For the most recent information go to [email or web address]    Prevention steps for People with confirmed or suspected COVID-19 (including persons under investigation) who do not need to be hospitalized  and   People with confirmed COVID-19 who were hospitalized and determined to be medically stable to go home    Your healthcare provider and public health staff will evaluate whether you can be cared for at home. If it is determined that you do not need to be hospitalized and can be isolated at home, you will be monitored by staff from your local or state health department. You should follow the prevention steps below until a healthcare provider or local or state health department says you can return to your normal activities. Stay home except to get medical care  People who are mildly ill with COVID-19 are able to isolate at home during their illness. You should restrict activities outside your home, except for getting medical care. Do not go to work, school, or public areas. Avoid using public transportation, ride-sharing, or taxis. Separate yourself from other people and animals in your home  People: As much as possible, you should stay in a specific room and away from other people in your home.  Also, you should use a separate bathroom, if available. Animals: You should restrict contact with pets and other animals while you are sick with COVID-19, just like you would around other people. Although there have not been reports of pets or other animals becoming sick with COVID-19, it is still recommended that people sick with COVID-19 limit contact with animals until more information is known about the virus. When possible, have another member of your household care for your animals while you are sick. If you are sick with COVID-19, avoid contact with your pet, including petting, snuggling, being kissed or licked, and sharing food. If you must care for your pet or be around animals while you are sick, wash your hands before and after you interact with pets and wear a facemask. Call ahead before visiting your doctor  If you have a medical appointment, call the healthcare provider and tell them that you have or may have COVID-19. This will help the healthcare providers office take steps to keep other people from getting infected or exposed. Wear a facemask  You should wear a facemask when you are around other people (e.g., sharing a room or vehicle) or pets and before you enter a healthcare providers office. If you are not able to wear a facemask (for example, because it causes trouble breathing), then people who live with you should not stay in the same room with you, or they should wear a facemask if they enter your room. Cover your coughs and sneezes  Cover your mouth and nose with a tissue when you cough or sneeze. Throw used tissues in a lined trash can. Immediately wash your hands with soap and water for at least 20 seconds or, if soap and water are not available, clean your hands with an alcohol-based hand  that contains at least 60% alcohol.   Clean your hands often  Wash your hands often with soap and water for at least 20 seconds, especially after blowing your nose, coughing, or sneezing; going to the bathroom; and have a medical emergency and need to call 911, notify the dispatch personnel that you have, or are being evaluated for COVID-19. If possible, put on a facemask before emergency medical services arrive. Discontinuing home isolation  Patients with confirmed COVID-19 should remain under home isolation precautions until the risk of secondary transmission to others is thought to be low. The decision to discontinue home isolation precautions should be made on a case-by-case basis, in consultation with healthcare providers and state and local health departments.

## 2020-10-23 NOTE — PROGRESS NOTES
Noe Murrieta received a viral test for COVID-19. They were educated on isolation and quarantine as appropriate. For any symptoms, they were directed to seek care from their PCP, given contact information to establish with a doctor, directed to an urgent care or the emergency room.

## 2020-10-24 LAB — SARS-COV-2, NAA: NOT DETECTED

## 2020-10-26 NOTE — RESULT ENCOUNTER NOTE

## 2020-10-27 ENCOUNTER — ANESTHESIA EVENT (OUTPATIENT)
Dept: OPERATING ROOM | Age: 45
DRG: 331 | End: 2020-10-27
Payer: COMMERCIAL

## 2020-10-27 NOTE — ANESTHESIA PRE PROCEDURE
Department of Anesthesiology  Preprocedure Note       Name:  Anibal Braden   Age:  54 y.o.  :  1975                                          MRN:  [id number]         Date:  10/28/2020      Surgeon:  Salty Colon MD    Procedure: ROBOTIC SIGMOIDECTOMY, POSSIBLE OPEN PROCEDURE    HPI:  54 y.o. male  who presented to the ED with recurrent diffuse abdominal pain for which he was hospitalized. He reports fever at home to 101, chills and associated nausea. The pt had episode of diverticulitis in  of this year requiring IV antibiotics and was discharged on PO antibiotics after sx improved. He did have colonoscopy in August that was unremarkable. CT ABD/PELVIS:  Persistent findings consistent with changes related to sigmoid diverticulitis. There has been development of very small focal collections of air with minimal interloop fluid within the right pelvis associated with the changes of diverticulitis. No focal drainable fluid collection/abscess is visualized. Continued short-term follow-up examination would be helpful for re-evaluation. Interval near complete resolution of joseline intraperitoneal free air. No evidence of bowel obstruction. EK2019 Sinus  Rhythm 79; consider old inferior infarct. ECHO:  2019 Left ventricular systolic function is normal with ejection fraction estimated at 55-60 %. No regional wall motion abnormalities are noted. Normal left ventricular wall thickness. Normal left ventricular diastolic filling pressure. Mild tricuspid regurgitation. Systolic pulmonary artery pressure (SPAP) is normal    Stress Test:  MAX  (88% predicted)  Normal LV function. There is normal isotope uptake at stress and rest. There is no evidence of  myocardial ischemia or scar. Normal myocardial perfusion study.       Medications prior to admission:    Probiotic Product (PROBIOTIC-10 PO) Take 1 tablet by mouth daily      Allergies:  No Known Allergies    Problem List: apnea, wheezes and not a current smoker                           Cardiovascular:  Exercise tolerance: good (>4 METS),       (-) hypertension, past MI,  angina,  LORENZO and murmur    ECG reviewed  Rhythm: regular  Rate: normal  Echocardiogram reviewed  Stress test reviewed                Neuro/Psych:      (-) seizures, TIA and CVA           GI/Hepatic/Renal:   (+) morbid obesity     (-) GERD, hepatitis, liver disease and no renal disease      ROS comment: GI: See HPI. Endo/Other:        (-) diabetes mellitus, hypothyroidism, hyperthyroidism, arthritis               Abdominal:   (+) obese,         Vascular:     - DVT and PE. Anesthesia Plan      general     ASA 2     (Exparel-TAP Block(s) for post-op pain management per surgeon request.)  Induction: intravenous. MIPS: Prophylactic antiemetics administered. Anesthetic plan and risks discussed with patient. Plan discussed with CRNA.             Edi Urias MD

## 2020-10-28 ENCOUNTER — ANESTHESIA (OUTPATIENT)
Dept: OPERATING ROOM | Age: 45
DRG: 331 | End: 2020-10-28
Payer: COMMERCIAL

## 2020-10-28 ENCOUNTER — HOSPITAL ENCOUNTER (INPATIENT)
Age: 45
LOS: 5 days | Discharge: HOME | DRG: 331 | End: 2020-11-02
Attending: SURGERY | Admitting: SURGERY
Payer: COMMERCIAL

## 2020-10-28 VITALS
TEMPERATURE: 96.3 F | OXYGEN SATURATION: 100 % | SYSTOLIC BLOOD PRESSURE: 144 MMHG | RESPIRATION RATE: 1 BRPM | DIASTOLIC BLOOD PRESSURE: 90 MMHG

## 2020-10-28 PROBLEM — Z90.49 S/P LAPAROSCOPIC-ASSISTED SIGMOIDECTOMY: Status: ACTIVE | Noted: 2020-10-28

## 2020-10-28 LAB
ABO/RH: NORMAL
ANION GAP SERPL CALCULATED.3IONS-SCNC: 11 MMOL/L (ref 3–16)
ANTIBODY SCREEN: NORMAL
ANTIBODY SCREEN: NORMAL
BUN BLDV-MCNC: 11 MG/DL (ref 7–20)
CALCIUM SERPL-MCNC: 9.6 MG/DL (ref 8.3–10.6)
CHLORIDE BLD-SCNC: 102 MMOL/L (ref 99–110)
CO2: 23 MMOL/L (ref 21–32)
CREAT SERPL-MCNC: 0.9 MG/DL (ref 0.9–1.3)
GFR AFRICAN AMERICAN: >60
GFR NON-AFRICAN AMERICAN: >60
GLUCOSE BLD-MCNC: 103 MG/DL (ref 70–99)
GLUCOSE BLD-MCNC: 106 MG/DL (ref 70–99)
GLUCOSE BLD-MCNC: 109 MG/DL (ref 70–99)
GLUCOSE BLD-MCNC: 117 MG/DL (ref 70–99)
GLUCOSE BLD-MCNC: 121 MG/DL (ref 70–99)
GLUCOSE BLD-MCNC: 125 MG/DL (ref 70–99)
GLUCOSE BLD-MCNC: 132 MG/DL (ref 70–99)
GLUCOSE BLD-MCNC: 92 MG/DL (ref 70–99)
GLUCOSE BLD-MCNC: 96 MG/DL (ref 70–99)
HCT VFR BLD CALC: 45.5 % (ref 40.5–52.5)
HEMOGLOBIN: 15.6 G/DL (ref 13.5–17.5)
MCH RBC QN AUTO: 29.9 PG (ref 26–34)
MCHC RBC AUTO-ENTMCNC: 34.3 G/DL (ref 31–36)
MCV RBC AUTO: 87.3 FL (ref 80–100)
PDW BLD-RTO: 13.8 % (ref 12.4–15.4)
PERFORMED ON: ABNORMAL
PERFORMED ON: NORMAL
PLATELET # BLD: 246 K/UL (ref 135–450)
PMV BLD AUTO: 6.8 FL (ref 5–10.5)
POTASSIUM SERPL-SCNC: 3.5 MMOL/L (ref 3.5–5.1)
RBC # BLD: 5.2 M/UL (ref 4.2–5.9)
SODIUM BLD-SCNC: 136 MMOL/L (ref 136–145)
WBC # BLD: 7.3 K/UL (ref 4–11)

## 2020-10-28 PROCEDURE — 2709999900 HC NON-CHARGEABLE SUPPLY: Performed by: SURGERY

## 2020-10-28 PROCEDURE — 6360000002 HC RX W HCPCS: Performed by: SURGERY

## 2020-10-28 PROCEDURE — 86901 BLOOD TYPING SEROLOGIC RH(D): CPT

## 2020-10-28 PROCEDURE — 6360000002 HC RX W HCPCS: Performed by: ANESTHESIOLOGY

## 2020-10-28 PROCEDURE — 3600000019 HC SURGERY ROBOT ADDTL 15MIN: Performed by: SURGERY

## 2020-10-28 PROCEDURE — 2580000003 HC RX 258: Performed by: SURGERY

## 2020-10-28 PROCEDURE — 7100000001 HC PACU RECOVERY - ADDTL 15 MIN: Performed by: SURGERY

## 2020-10-28 PROCEDURE — 2500000003 HC RX 250 WO HCPCS: Performed by: NURSE ANESTHETIST, CERTIFIED REGISTERED

## 2020-10-28 PROCEDURE — C9290 INJ, BUPIVACAINE LIPOSOME: HCPCS | Performed by: ANESTHESIOLOGY

## 2020-10-28 PROCEDURE — 2700000000 HC OXYGEN THERAPY PER DAY

## 2020-10-28 PROCEDURE — 80048 BASIC METABOLIC PNL TOTAL CA: CPT

## 2020-10-28 PROCEDURE — 2500000003 HC RX 250 WO HCPCS: Performed by: ANESTHESIOLOGY

## 2020-10-28 PROCEDURE — 2500000003 HC RX 250 WO HCPCS: Performed by: SURGERY

## 2020-10-28 PROCEDURE — 0DTN4ZZ RESECTION OF SIGMOID COLON, PERCUTANEOUS ENDOSCOPIC APPROACH: ICD-10-PCS | Performed by: SURGERY

## 2020-10-28 PROCEDURE — C1729 CATH, DRAINAGE: HCPCS | Performed by: SURGERY

## 2020-10-28 PROCEDURE — 7100000000 HC PACU RECOVERY - FIRST 15 MIN: Performed by: SURGERY

## 2020-10-28 PROCEDURE — 6360000002 HC RX W HCPCS: Performed by: NURSE ANESTHETIST, CERTIFIED REGISTERED

## 2020-10-28 PROCEDURE — 6370000000 HC RX 637 (ALT 250 FOR IP): Performed by: SURGERY

## 2020-10-28 PROCEDURE — 2580000003 HC RX 258: Performed by: ANESTHESIOLOGY

## 2020-10-28 PROCEDURE — 2720000010 HC SURG SUPPLY STERILE: Performed by: SURGERY

## 2020-10-28 PROCEDURE — 86850 RBC ANTIBODY SCREEN: CPT

## 2020-10-28 PROCEDURE — 94761 N-INVAS EAR/PLS OXIMETRY MLT: CPT

## 2020-10-28 PROCEDURE — 86900 BLOOD TYPING SEROLOGIC ABO: CPT

## 2020-10-28 PROCEDURE — 85027 COMPLETE CBC AUTOMATED: CPT

## 2020-10-28 PROCEDURE — S2900 ROBOTIC SURGICAL SYSTEM: HCPCS | Performed by: SURGERY

## 2020-10-28 PROCEDURE — 3700000001 HC ADD 15 MINUTES (ANESTHESIA): Performed by: SURGERY

## 2020-10-28 PROCEDURE — 3600000009 HC SURGERY ROBOT BASE: Performed by: SURGERY

## 2020-10-28 PROCEDURE — 1200000000 HC SEMI PRIVATE

## 2020-10-28 PROCEDURE — 44207 L COLECTOMY/COLOPROCTOSTOMY: CPT | Performed by: SURGERY

## 2020-10-28 PROCEDURE — 8E0W4CZ ROBOTIC ASSISTED PROCEDURE OF TRUNK REGION, PERCUTANEOUS ENDOSCOPIC APPROACH: ICD-10-PCS | Performed by: SURGERY

## 2020-10-28 PROCEDURE — 6370000000 HC RX 637 (ALT 250 FOR IP): Performed by: ANESTHESIOLOGY

## 2020-10-28 PROCEDURE — 88307 TISSUE EXAM BY PATHOLOGIST: CPT

## 2020-10-28 PROCEDURE — 3700000000 HC ANESTHESIA ATTENDED CARE: Performed by: SURGERY

## 2020-10-28 PROCEDURE — 64488 TAP BLOCK BI INJECTION: CPT | Performed by: ANESTHESIOLOGY

## 2020-10-28 RX ORDER — SODIUM CHLORIDE, SODIUM LACTATE, POTASSIUM CHLORIDE, CALCIUM CHLORIDE 600; 310; 30; 20 MG/100ML; MG/100ML; MG/100ML; MG/100ML
INJECTION, SOLUTION INTRAVENOUS CONTINUOUS
Status: DISCONTINUED | OUTPATIENT
Start: 2020-10-28 | End: 2020-10-28

## 2020-10-28 RX ORDER — ONDANSETRON 2 MG/ML
4 INJECTION INTRAMUSCULAR; INTRAVENOUS
Status: DISCONTINUED | OUTPATIENT
Start: 2020-10-28 | End: 2020-10-28

## 2020-10-28 RX ORDER — ONDANSETRON 2 MG/ML
4 INJECTION INTRAMUSCULAR; INTRAVENOUS EVERY 6 HOURS PRN
Status: DISCONTINUED | OUTPATIENT
Start: 2020-10-28 | End: 2020-11-02 | Stop reason: HOSPADM

## 2020-10-28 RX ORDER — SODIUM CHLORIDE 9 MG/ML
INJECTION, SOLUTION INTRAVENOUS CONTINUOUS
Status: DISCONTINUED | OUTPATIENT
Start: 2020-10-28 | End: 2020-11-02 | Stop reason: HOSPADM

## 2020-10-28 RX ORDER — MORPHINE SULFATE 4 MG/ML
4 INJECTION, SOLUTION INTRAMUSCULAR; INTRAVENOUS
Status: DISCONTINUED | OUTPATIENT
Start: 2020-10-28 | End: 2020-11-02 | Stop reason: HOSPADM

## 2020-10-28 RX ORDER — FENTANYL CITRATE 50 UG/ML
25 INJECTION, SOLUTION INTRAMUSCULAR; INTRAVENOUS EVERY 5 MIN PRN
Status: DISCONTINUED | OUTPATIENT
Start: 2020-10-28 | End: 2020-10-28

## 2020-10-28 RX ORDER — FAMOTIDINE 20 MG/1
20 TABLET, FILM COATED ORAL DAILY
Status: DISCONTINUED | OUTPATIENT
Start: 2020-10-28 | End: 2020-11-02 | Stop reason: HOSPADM

## 2020-10-28 RX ORDER — LIDOCAINE HYDROCHLORIDE 10 MG/ML
0.3 INJECTION, SOLUTION EPIDURAL; INFILTRATION; INTRACAUDAL; PERINEURAL
Status: DISCONTINUED | OUTPATIENT
Start: 2020-10-28 | End: 2020-10-28

## 2020-10-28 RX ORDER — HYDRALAZINE HYDROCHLORIDE 20 MG/ML
5 INJECTION INTRAMUSCULAR; INTRAVENOUS EVERY 10 MIN PRN
Status: DISCONTINUED | OUTPATIENT
Start: 2020-10-28 | End: 2020-10-28

## 2020-10-28 RX ORDER — SODIUM CHLORIDE, SODIUM LACTATE, POTASSIUM CHLORIDE, AND CALCIUM CHLORIDE .6; .31; .03; .02 G/100ML; G/100ML; G/100ML; G/100ML
IRRIGANT IRRIGATION PRN
Status: DISCONTINUED | OUTPATIENT
Start: 2020-10-28 | End: 2020-10-28 | Stop reason: ALTCHOICE

## 2020-10-28 RX ORDER — DEXAMETHASONE SODIUM PHOSPHATE 4 MG/ML
INJECTION, SOLUTION INTRA-ARTICULAR; INTRALESIONAL; INTRAMUSCULAR; INTRAVENOUS; SOFT TISSUE PRN
Status: DISCONTINUED | OUTPATIENT
Start: 2020-10-28 | End: 2020-10-28 | Stop reason: SDUPTHER

## 2020-10-28 RX ORDER — SODIUM CHLORIDE 0.9 % (FLUSH) 0.9 %
10 SYRINGE (ML) INJECTION PRN
Status: DISCONTINUED | OUTPATIENT
Start: 2020-10-28 | End: 2020-11-02 | Stop reason: HOSPADM

## 2020-10-28 RX ORDER — SODIUM CHLORIDE 0.9 % (FLUSH) 0.9 %
10 SYRINGE (ML) INJECTION EVERY 12 HOURS SCHEDULED
Status: DISCONTINUED | OUTPATIENT
Start: 2020-10-28 | End: 2020-10-28

## 2020-10-28 RX ORDER — DEXTROSE MONOHYDRATE 50 MG/ML
100 INJECTION, SOLUTION INTRAVENOUS PRN
Status: DISCONTINUED | OUTPATIENT
Start: 2020-10-28 | End: 2020-11-02 | Stop reason: HOSPADM

## 2020-10-28 RX ORDER — EPHEDRINE SULFATE 50 MG/ML
INJECTION INTRAVENOUS PRN
Status: DISCONTINUED | OUTPATIENT
Start: 2020-10-28 | End: 2020-10-28

## 2020-10-28 RX ORDER — SODIUM CHLORIDE 0.9 % (FLUSH) 0.9 %
10 SYRINGE (ML) INJECTION PRN
Status: DISCONTINUED | OUTPATIENT
Start: 2020-10-28 | End: 2020-10-28

## 2020-10-28 RX ORDER — ONDANSETRON 2 MG/ML
INJECTION INTRAMUSCULAR; INTRAVENOUS PRN
Status: DISCONTINUED | OUTPATIENT
Start: 2020-10-28 | End: 2020-10-28 | Stop reason: SDUPTHER

## 2020-10-28 RX ORDER — MEPERIDINE HYDROCHLORIDE 50 MG/ML
12.5 INJECTION INTRAMUSCULAR; INTRAVENOUS; SUBCUTANEOUS EVERY 5 MIN PRN
Status: DISCONTINUED | OUTPATIENT
Start: 2020-10-28 | End: 2020-10-28

## 2020-10-28 RX ORDER — NICOTINE POLACRILEX 4 MG
15 LOZENGE BUCCAL PRN
Status: DISCONTINUED | OUTPATIENT
Start: 2020-10-28 | End: 2020-11-02 | Stop reason: HOSPADM

## 2020-10-28 RX ORDER — PROPOFOL 10 MG/ML
INJECTION, EMULSION INTRAVENOUS PRN
Status: DISCONTINUED | OUTPATIENT
Start: 2020-10-28 | End: 2020-10-28 | Stop reason: SDUPTHER

## 2020-10-28 RX ORDER — PROMETHAZINE HYDROCHLORIDE 25 MG/1
12.5 TABLET ORAL EVERY 6 HOURS PRN
Status: DISCONTINUED | OUTPATIENT
Start: 2020-10-28 | End: 2020-11-02 | Stop reason: HOSPADM

## 2020-10-28 RX ORDER — DEXTROSE MONOHYDRATE 25 G/50ML
12.5 INJECTION, SOLUTION INTRAVENOUS PRN
Status: DISCONTINUED | OUTPATIENT
Start: 2020-10-28 | End: 2020-11-02 | Stop reason: HOSPADM

## 2020-10-28 RX ORDER — OXYCODONE HYDROCHLORIDE 5 MG/1
10 TABLET ORAL EVERY 4 HOURS PRN
Status: DISCONTINUED | OUTPATIENT
Start: 2020-10-28 | End: 2020-11-02 | Stop reason: HOSPADM

## 2020-10-28 RX ORDER — SODIUM CHLORIDE 0.9 % (FLUSH) 0.9 %
10 SYRINGE (ML) INJECTION EVERY 12 HOURS SCHEDULED
Status: DISCONTINUED | OUTPATIENT
Start: 2020-10-28 | End: 2020-11-02 | Stop reason: HOSPADM

## 2020-10-28 RX ORDER — BUPIVACAINE HYDROCHLORIDE 5 MG/ML
INJECTION, SOLUTION EPIDURAL; INTRACAUDAL PRN
Status: DISCONTINUED | OUTPATIENT
Start: 2020-10-28 | End: 2020-10-28 | Stop reason: SDUPTHER

## 2020-10-28 RX ORDER — LIDOCAINE HYDROCHLORIDE 20 MG/ML
INJECTION, SOLUTION INFILTRATION; PERINEURAL PRN
Status: DISCONTINUED | OUTPATIENT
Start: 2020-10-28 | End: 2020-10-28 | Stop reason: SDUPTHER

## 2020-10-28 RX ORDER — HYDROMORPHONE HCL 110MG/55ML
PATIENT CONTROLLED ANALGESIA SYRINGE INTRAVENOUS PRN
Status: DISCONTINUED | OUTPATIENT
Start: 2020-10-28 | End: 2020-10-28 | Stop reason: SDUPTHER

## 2020-10-28 RX ORDER — EPHEDRINE SULFATE 50 MG/ML
INJECTION INTRAVENOUS PRN
Status: DISCONTINUED | OUTPATIENT
Start: 2020-10-28 | End: 2020-10-28 | Stop reason: SDUPTHER

## 2020-10-28 RX ORDER — OXYCODONE HYDROCHLORIDE AND ACETAMINOPHEN 5; 325 MG/1; MG/1
1 TABLET ORAL PRN
Status: DISCONTINUED | OUTPATIENT
Start: 2020-10-28 | End: 2020-10-28

## 2020-10-28 RX ORDER — OXYCODONE HYDROCHLORIDE AND ACETAMINOPHEN 5; 325 MG/1; MG/1
2 TABLET ORAL PRN
Status: DISCONTINUED | OUTPATIENT
Start: 2020-10-28 | End: 2020-10-28

## 2020-10-28 RX ORDER — PROMETHAZINE HYDROCHLORIDE 25 MG/ML
6.25 INJECTION, SOLUTION INTRAMUSCULAR; INTRAVENOUS
Status: DISCONTINUED | OUTPATIENT
Start: 2020-10-28 | End: 2020-10-28

## 2020-10-28 RX ORDER — MIDAZOLAM HYDROCHLORIDE 1 MG/ML
INJECTION INTRAMUSCULAR; INTRAVENOUS PRN
Status: DISCONTINUED | OUTPATIENT
Start: 2020-10-28 | End: 2020-10-28 | Stop reason: SDUPTHER

## 2020-10-28 RX ORDER — ROCURONIUM BROMIDE 10 MG/ML
INJECTION, SOLUTION INTRAVENOUS PRN
Status: DISCONTINUED | OUTPATIENT
Start: 2020-10-28 | End: 2020-10-28 | Stop reason: SDUPTHER

## 2020-10-28 RX ORDER — OXYCODONE HYDROCHLORIDE 5 MG/1
5 TABLET ORAL EVERY 4 HOURS PRN
Status: DISCONTINUED | OUTPATIENT
Start: 2020-10-28 | End: 2020-11-02 | Stop reason: HOSPADM

## 2020-10-28 RX ORDER — APREPITANT 40 MG/1
40 CAPSULE ORAL ONCE
Status: COMPLETED | OUTPATIENT
Start: 2020-10-28 | End: 2020-10-28

## 2020-10-28 RX ORDER — ACETAMINOPHEN 325 MG/1
650 TABLET ORAL EVERY 4 HOURS PRN
Status: DISCONTINUED | OUTPATIENT
Start: 2020-10-28 | End: 2020-11-02 | Stop reason: HOSPADM

## 2020-10-28 RX ORDER — MORPHINE SULFATE 2 MG/ML
2 INJECTION, SOLUTION INTRAMUSCULAR; INTRAVENOUS
Status: DISCONTINUED | OUTPATIENT
Start: 2020-10-28 | End: 2020-11-02 | Stop reason: HOSPADM

## 2020-10-28 RX ORDER — FENTANYL CITRATE 50 UG/ML
INJECTION, SOLUTION INTRAMUSCULAR; INTRAVENOUS PRN
Status: DISCONTINUED | OUTPATIENT
Start: 2020-10-28 | End: 2020-10-28 | Stop reason: SDUPTHER

## 2020-10-28 RX ORDER — SCOLOPAMINE TRANSDERMAL SYSTEM 1 MG/1
1 PATCH, EXTENDED RELEASE TRANSDERMAL
Status: DISCONTINUED | OUTPATIENT
Start: 2020-10-28 | End: 2020-11-02 | Stop reason: HOSPADM

## 2020-10-28 RX ORDER — INDOCYANINE GREEN AND WATER 25 MG
KIT INJECTION PRN
Status: DISCONTINUED | OUTPATIENT
Start: 2020-10-28 | End: 2020-10-28 | Stop reason: SDUPTHER

## 2020-10-28 RX ADMIN — OXYCODONE 10 MG: 5 TABLET ORAL at 22:08

## 2020-10-28 RX ADMIN — MORPHINE SULFATE 4 MG: 4 INJECTION, SOLUTION INTRAMUSCULAR; INTRAVENOUS at 19:24

## 2020-10-28 RX ADMIN — BUPIVACAINE 10 ML: 13.3 INJECTION, SUSPENSION, LIPOSOMAL INFILTRATION at 11:15

## 2020-10-28 RX ADMIN — ROCURONIUM BROMIDE 20 MG: 10 SOLUTION INTRAVENOUS at 13:32

## 2020-10-28 RX ADMIN — MIDAZOLAM HYDROCHLORIDE 2 MG: 2 INJECTION, SOLUTION INTRAMUSCULAR; INTRAVENOUS at 10:58

## 2020-10-28 RX ADMIN — EPHEDRINE SULFATE 10 MG: 50 INJECTION INTRAVENOUS at 11:18

## 2020-10-28 RX ADMIN — CEFAZOLIN SODIUM 2 G: 10 INJECTION, POWDER, FOR SOLUTION INTRAVENOUS at 15:00

## 2020-10-28 RX ADMIN — DEXAMETHASONE SODIUM PHOSPHATE 4 MG: 4 INJECTION, SOLUTION INTRAMUSCULAR; INTRAVENOUS at 11:29

## 2020-10-28 RX ADMIN — EPHEDRINE SULFATE 10 MG: 50 INJECTION INTRAVENOUS at 13:01

## 2020-10-28 RX ADMIN — CEFAZOLIN SODIUM 2 G: 10 INJECTION, POWDER, FOR SOLUTION INTRAVENOUS at 11:00

## 2020-10-28 RX ADMIN — BUPIVACAINE HYDROCHLORIDE 10 ML: 5 INJECTION, SOLUTION EPIDURAL; INTRACAUDAL; PERINEURAL at 11:16

## 2020-10-28 RX ADMIN — SODIUM CHLORIDE, POTASSIUM CHLORIDE, SODIUM LACTATE AND CALCIUM CHLORIDE: 600; 310; 30; 20 INJECTION, SOLUTION INTRAVENOUS at 10:58

## 2020-10-28 RX ADMIN — HYDROMORPHONE HYDROCHLORIDE 0.5 MG: 1 INJECTION, SOLUTION INTRAMUSCULAR; INTRAVENOUS; SUBCUTANEOUS at 17:47

## 2020-10-28 RX ADMIN — SUGAMMADEX 200 MG: 100 INJECTION, SOLUTION INTRAVENOUS at 15:01

## 2020-10-28 RX ADMIN — ROCURONIUM BROMIDE 70 MG: 10 SOLUTION INTRAVENOUS at 11:04

## 2020-10-28 RX ADMIN — APREPITANT 40 MG: 40 CAPSULE ORAL at 10:07

## 2020-10-28 RX ADMIN — ROCURONIUM BROMIDE 5 MG: 10 SOLUTION INTRAVENOUS at 14:24

## 2020-10-28 RX ADMIN — PROPOFOL 50 MG: 10 INJECTION, EMULSION INTRAVENOUS at 11:03

## 2020-10-28 RX ADMIN — ONDANSETRON 4 MG: 2 INJECTION INTRAMUSCULAR; INTRAVENOUS at 19:24

## 2020-10-28 RX ADMIN — HYDROMORPHONE HYDROCHLORIDE 0.5 MG: 2 INJECTION INTRAMUSCULAR; INTRAVENOUS; SUBCUTANEOUS at 12:16

## 2020-10-28 RX ADMIN — ROCURONIUM BROMIDE 10 MG: 10 SOLUTION INTRAVENOUS at 14:15

## 2020-10-28 RX ADMIN — FENTANYL CITRATE 50 MCG: 50 INJECTION INTRAMUSCULAR; INTRAVENOUS at 14:40

## 2020-10-28 RX ADMIN — BUPIVACAINE HYDROCHLORIDE 10 ML: 5 INJECTION, SOLUTION EPIDURAL; INTRACAUDAL; PERINEURAL at 11:17

## 2020-10-28 RX ADMIN — SODIUM CHLORIDE, POTASSIUM CHLORIDE, SODIUM LACTATE AND CALCIUM CHLORIDE: 600; 310; 30; 20 INJECTION, SOLUTION INTRAVENOUS at 12:14

## 2020-10-28 RX ADMIN — INDOCYANINE GREEN AND WATER 6.25 MG: KIT at 13:35

## 2020-10-28 RX ADMIN — BUPIVACAINE HYDROCHLORIDE 10 ML: 5 INJECTION, SOLUTION EPIDURAL; INTRACAUDAL; PERINEURAL at 11:19

## 2020-10-28 RX ADMIN — BUPIVACAINE 10 ML: 13.3 INJECTION, SUSPENSION, LIPOSOMAL INFILTRATION at 11:18

## 2020-10-28 RX ADMIN — BUPIVACAINE HYDROCHLORIDE 10 ML: 5 INJECTION, SOLUTION EPIDURAL; INTRACAUDAL; PERINEURAL at 11:20

## 2020-10-28 RX ADMIN — SODIUM CHLORIDE, POTASSIUM CHLORIDE, SODIUM LACTATE AND CALCIUM CHLORIDE: 600; 310; 30; 20 INJECTION, SOLUTION INTRAVENOUS at 10:07

## 2020-10-28 RX ADMIN — PROPOFOL 200 MG: 10 INJECTION, EMULSION INTRAVENOUS at 11:02

## 2020-10-28 RX ADMIN — ROCURONIUM BROMIDE 10 MG: 10 SOLUTION INTRAVENOUS at 12:45

## 2020-10-28 RX ADMIN — INDOCYANINE GREEN AND WATER 12.5 MG: KIT at 12:45

## 2020-10-28 RX ADMIN — METRONIDAZOLE 500 MG: 500 INJECTION, SOLUTION INTRAVENOUS at 11:15

## 2020-10-28 RX ADMIN — FAMOTIDINE 20 MG: 20 TABLET ORAL at 22:08

## 2020-10-28 RX ADMIN — SODIUM CHLORIDE: 9 INJECTION, SOLUTION INTRAVENOUS at 18:35

## 2020-10-28 RX ADMIN — CEFAZOLIN SODIUM 2 G: 10 INJECTION, POWDER, FOR SOLUTION INTRAVENOUS at 22:08

## 2020-10-28 RX ADMIN — EPHEDRINE SULFATE 10 MG: 50 INJECTION INTRAVENOUS at 14:26

## 2020-10-28 RX ADMIN — LIDOCAINE HYDROCHLORIDE 40 MG: 20 INJECTION, SOLUTION INFILTRATION; PERINEURAL at 11:02

## 2020-10-28 RX ADMIN — SODIUM CHLORIDE, POTASSIUM CHLORIDE, SODIUM LACTATE AND CALCIUM CHLORIDE: 600; 310; 30; 20 INJECTION, SOLUTION INTRAVENOUS at 13:35

## 2020-10-28 RX ADMIN — FENTANYL CITRATE 100 MCG: 50 INJECTION INTRAMUSCULAR; INTRAVENOUS at 11:02

## 2020-10-28 RX ADMIN — ONDANSETRON 4 MG: 2 INJECTION INTRAMUSCULAR; INTRAVENOUS at 14:20

## 2020-10-28 ASSESSMENT — PULMONARY FUNCTION TESTS
PIF_VALUE: 36
PIF_VALUE: 35
PIF_VALUE: 31
PIF_VALUE: 24
PIF_VALUE: 24
PIF_VALUE: 36
PIF_VALUE: 24
PIF_VALUE: 35
PIF_VALUE: 7
PIF_VALUE: 31
PIF_VALUE: 24
PIF_VALUE: 31
PIF_VALUE: 37
PIF_VALUE: 24
PIF_VALUE: 35
PIF_VALUE: 35
PIF_VALUE: 33
PIF_VALUE: 33
PIF_VALUE: 25
PIF_VALUE: 34
PIF_VALUE: 37
PIF_VALUE: 31
PIF_VALUE: 31
PIF_VALUE: 36
PIF_VALUE: 31
PIF_VALUE: 36
PIF_VALUE: 35
PIF_VALUE: 30
PIF_VALUE: 24
PIF_VALUE: 35
PIF_VALUE: 1
PIF_VALUE: 31
PIF_VALUE: 35
PIF_VALUE: 24
PIF_VALUE: 34
PIF_VALUE: 31
PIF_VALUE: 22
PIF_VALUE: 31
PIF_VALUE: 35
PIF_VALUE: 0
PIF_VALUE: 31
PIF_VALUE: 13
PIF_VALUE: 19
PIF_VALUE: 35
PIF_VALUE: 36
PIF_VALUE: 36
PIF_VALUE: 37
PIF_VALUE: 31
PIF_VALUE: 35
PIF_VALUE: 36
PIF_VALUE: 31
PIF_VALUE: 33
PIF_VALUE: 1
PIF_VALUE: 31
PIF_VALUE: 35
PIF_VALUE: 24
PIF_VALUE: 35
PIF_VALUE: 19
PIF_VALUE: 31
PIF_VALUE: 24
PIF_VALUE: 36
PIF_VALUE: 1
PIF_VALUE: 35
PIF_VALUE: 31
PIF_VALUE: 31
PIF_VALUE: 23
PIF_VALUE: 35
PIF_VALUE: 35
PIF_VALUE: 36
PIF_VALUE: 31
PIF_VALUE: 24
PIF_VALUE: 31
PIF_VALUE: 33
PIF_VALUE: 36
PIF_VALUE: 13
PIF_VALUE: 31
PIF_VALUE: 36
PIF_VALUE: 12
PIF_VALUE: 37
PIF_VALUE: 23
PIF_VALUE: 22
PIF_VALUE: 34
PIF_VALUE: 32
PIF_VALUE: 36
PIF_VALUE: 35
PIF_VALUE: 6
PIF_VALUE: 36
PIF_VALUE: 31
PIF_VALUE: 23
PIF_VALUE: 31
PIF_VALUE: 31
PIF_VALUE: 36
PIF_VALUE: 23
PIF_VALUE: 1
PIF_VALUE: 39
PIF_VALUE: 31
PIF_VALUE: 36
PIF_VALUE: 25
PIF_VALUE: 31
PIF_VALUE: 35
PIF_VALUE: 36
PIF_VALUE: 31
PIF_VALUE: 24
PIF_VALUE: 31
PIF_VALUE: 23
PIF_VALUE: 23
PIF_VALUE: 35
PIF_VALUE: 31
PIF_VALUE: 23
PIF_VALUE: 31
PIF_VALUE: 24
PIF_VALUE: 23
PIF_VALUE: 31
PIF_VALUE: 36
PIF_VALUE: 36
PIF_VALUE: 31
PIF_VALUE: 35
PIF_VALUE: 36
PIF_VALUE: 34
PIF_VALUE: 24
PIF_VALUE: 37
PIF_VALUE: 33
PIF_VALUE: 35
PIF_VALUE: 24
PIF_VALUE: 35
PIF_VALUE: 35
PIF_VALUE: 32
PIF_VALUE: 35
PIF_VALUE: 31
PIF_VALUE: 35
PIF_VALUE: 25
PIF_VALUE: 36
PIF_VALUE: 24
PIF_VALUE: 26
PIF_VALUE: 35
PIF_VALUE: 19
PIF_VALUE: 17
PIF_VALUE: 24
PIF_VALUE: 29
PIF_VALUE: 35
PIF_VALUE: 24
PIF_VALUE: 1
PIF_VALUE: 25
PIF_VALUE: 31
PIF_VALUE: 37
PIF_VALUE: 36
PIF_VALUE: 31
PIF_VALUE: 32
PIF_VALUE: 31
PIF_VALUE: 24
PIF_VALUE: 25
PIF_VALUE: 35
PIF_VALUE: 24
PIF_VALUE: 31
PIF_VALUE: 36
PIF_VALUE: 35
PIF_VALUE: 1
PIF_VALUE: 31
PIF_VALUE: 35
PIF_VALUE: 24
PIF_VALUE: 31
PIF_VALUE: 6
PIF_VALUE: 24
PIF_VALUE: 35
PIF_VALUE: 33
PIF_VALUE: 17
PIF_VALUE: 33
PIF_VALUE: 31
PIF_VALUE: 35
PIF_VALUE: 25
PIF_VALUE: 31
PIF_VALUE: 31
PIF_VALUE: 36
PIF_VALUE: 36
PIF_VALUE: 31
PIF_VALUE: 35
PIF_VALUE: 31
PIF_VALUE: 4
PIF_VALUE: 31
PIF_VALUE: 24
PIF_VALUE: 24
PIF_VALUE: 31
PIF_VALUE: 33
PIF_VALUE: 31
PIF_VALUE: 33
PIF_VALUE: 32
PIF_VALUE: 35
PIF_VALUE: 33
PIF_VALUE: 24
PIF_VALUE: 34
PIF_VALUE: 36
PIF_VALUE: 28
PIF_VALUE: 35
PIF_VALUE: 35
PIF_VALUE: 24
PIF_VALUE: 31
PIF_VALUE: 34
PIF_VALUE: 24
PIF_VALUE: 36
PIF_VALUE: 35
PIF_VALUE: 36
PIF_VALUE: 31
PIF_VALUE: 6
PIF_VALUE: 31
PIF_VALUE: 31
PIF_VALUE: 37
PIF_VALUE: 34
PIF_VALUE: 31
PIF_VALUE: 31
PIF_VALUE: 33
PIF_VALUE: 24
PIF_VALUE: 36
PIF_VALUE: 31
PIF_VALUE: 24
PIF_VALUE: 23
PIF_VALUE: 36
PIF_VALUE: 24
PIF_VALUE: 36
PIF_VALUE: 18
PIF_VALUE: 7
PIF_VALUE: 34
PIF_VALUE: 35
PIF_VALUE: 31
PIF_VALUE: 31
PIF_VALUE: 29
PIF_VALUE: 23
PIF_VALUE: 31
PIF_VALUE: 24
PIF_VALUE: 33
PIF_VALUE: 36
PIF_VALUE: 31
PIF_VALUE: 24
PIF_VALUE: 31
PIF_VALUE: 31
PIF_VALUE: 36
PIF_VALUE: 24
PIF_VALUE: 31
PIF_VALUE: 32
PIF_VALUE: 36
PIF_VALUE: 31
PIF_VALUE: 31
PIF_VALUE: 36
PIF_VALUE: 31
PIF_VALUE: 36
PIF_VALUE: 31

## 2020-10-28 ASSESSMENT — PAIN SCALES - GENERAL
PAINLEVEL_OUTOF10: 8
PAINLEVEL_OUTOF10: 0
PAINLEVEL_OUTOF10: 7
PAINLEVEL_OUTOF10: 0
PAINLEVEL_OUTOF10: 6
PAINLEVEL_OUTOF10: 0
PAINLEVEL_OUTOF10: 6
PAINLEVEL_OUTOF10: 8
PAINLEVEL_OUTOF10: 7
PAINLEVEL_OUTOF10: 0

## 2020-10-28 ASSESSMENT — PAIN DESCRIPTION - ORIENTATION: ORIENTATION: MID

## 2020-10-28 ASSESSMENT — PAIN DESCRIPTION - LOCATION
LOCATION: ABDOMEN
LOCATION: ABDOMEN

## 2020-10-28 ASSESSMENT — PAIN DESCRIPTION - DESCRIPTORS: DESCRIPTORS: ACHING

## 2020-10-28 ASSESSMENT — PAIN - FUNCTIONAL ASSESSMENT: PAIN_FUNCTIONAL_ASSESSMENT: 0-10

## 2020-10-28 ASSESSMENT — PAIN DESCRIPTION - FREQUENCY: FREQUENCY: INTERMITTENT

## 2020-10-28 ASSESSMENT — PAIN DESCRIPTION - PAIN TYPE
TYPE: SURGICAL PAIN
TYPE: SURGICAL PAIN

## 2020-10-28 ASSESSMENT — LIFESTYLE VARIABLES: SMOKING_STATUS: 0

## 2020-10-28 NOTE — PROGRESS NOTES
Patient taking ice chips, given dilaudid 0.5 mg IVP for abdominal pain, VS WNL, family to room waiting for patient. Report to Marj Sevilla.

## 2020-10-28 NOTE — PROGRESS NOTES
4 Eyes Skin Assessment     The patient is being assess for   Transfer to New Unit    I agree that 2 RN's have performed a thorough Head to Toe Skin Assessment on the patient. ALL assessment sites listed below have been assessed. Areas assessed by both nurses:   [x]   Head, Face, and Ears   [x]   Shoulders, Back, and Chest, Abdomen  [x]   Arms, Elbows, and Hands   [x]   Coccyx, Sacrum, and Ischium  [x]   Legs, Feet, and Heels          **SHARE this note so that the co-signing nurse is able to place an eSignature**    Co-signer eSignature: Electronically signed by Fidelina Lopez RN on 10/28/20 at 6:25 PM EDT    Does the Patient have Skin Breakdown?   No          Fahad Prevention initiated:  No   Wound Care Orders initiated:  No      [facility] nurse consulted for Pressure Injury (Stage 3,4, Unstageable, DTI, NWPT, Complex wounds)and New or Established Ostomies:  No      Primary Nurse eSignature: Electronically signed by Benjamin Joshi RN on 10/28/20 at 8:45 PM EDT

## 2020-10-28 NOTE — BRIEF OP NOTE
Brief Postoperative Note      Patient: Angelica Molina  YOB: 1975  MRN: [id number]    Date of Procedure: 10/28/2020    Pre-Op Diagnosis: Diverticulitis of large intestine with perforation without bleeding    Post-Op Diagnosis: Same       Procedure(s):  ROBOTIC SIGMOIDECTOMY    Surgeon(s):  Romel Alexander MD    Assistant:  Surgical Assistant: Fausto Tabor RN; Sb Bedolla    Anesthesia: General    Estimated Blood Loss (mL): less than 50     Complications: None    Specimens:   ID Type Source Tests Collected by Time Destination   A : SIGMOID COLON OPEN END IS PROXIMAL MARGIN, AND DONUTS Tissue Colon SURGICAL PATHOLOGY Romel Alexander MD 10/28/2020 1350        Implants:  * No implants in log *      Drains:   Urethral Catheter 16 fr (Active)       Findings: mild residual inflammation of distal sigmoid; extensive/bulky epiploic appendages             Robotic sigmoidectomy w/ stapled 29mm EEA anastomosis            Small air leak seen initially, oversewn w/ no further leak seen.     Job#: [id number]    Electronically signed by Jason Ward MD on 10/29/2020 at 9:16 AM

## 2020-10-28 NOTE — ANESTHESIA PROCEDURE NOTES
Peripheral Block    Patient location during procedure: OR  Start time: 10/28/2020 11:15 AM  End time: 10/28/2020 11:21 AM  Staffing  Anesthesiologist: Karolyn Fay MD  Performed: anesthesiologist   Preanesthetic Checklist  Completed: patient identified, site marked, surgical consent, pre-op evaluation, timeout performed, IV checked, risks and benefits discussed, monitors and equipment checked, anesthesia consent given, oxygen available and patient being monitored  Peripheral Block  Patient position: supine  Prep: ChloraPrep  Patient monitoring: cardiac monitor, continuous pulse ox, continuous capnometry, frequent blood pressure checks and IV access  Block type: TAP  Laterality: bilateral  Injection technique: single-shot  Procedures: ultrasound guided  Provider prep: sterile gloves and mask  Needle  Needle gauge: 21 G  Needle length: 10 cm  Needle localization: ultrasound guidance  Test dose: negative  Assessment  Injection assessment: negative aspiration for heme and no paresthesia on injection  Slow fractionated injection: yes  Hemodynamics: stable  Additional Notes  Bilateral Transversus Abdominus Plane Blocks  Pt. agreesd to risks, benefits and alternatives to block prior to going to the OR. Block performed at the request of the surgeon for post-operative pain management   Immediately prior to procedure a \"time out\" was called to verify the correct patient, procedure, equipment, support staff.    Site/side marked as required  Side: left and right  Site/Approach: Bilateral flanks at the level of the umbilicus in the anterior axillary line  Position: Supine  Sedation:   GA  Local Anesthetic Dose:    none  Aseptic technique: prepped with chlorhexidine  Ultrasound: yes- see images    Needle:  21 g   100 mm block needle   Local Anesthetic:  Exparel 1.3%   Amount: 10 ml plus Bupivacaine  0.5%   Amount: 20 ml  in 5 ml increments after negative aspiration each time on each side (Exparel 20 ml total plus Bupi 40 ml).  Easy injection w/o resistance and w/o pain/paresthesias. Pt tolerated procedure well. No complications.   Reason for block: post-op pain management and at surgeon's request

## 2020-10-29 LAB
ANION GAP SERPL CALCULATED.3IONS-SCNC: 8 MMOL/L (ref 3–16)
BASOPHILS ABSOLUTE: 0 K/UL (ref 0–0.2)
BASOPHILS RELATIVE PERCENT: 0.4 %
BUN BLDV-MCNC: 7 MG/DL (ref 7–20)
CALCIUM SERPL-MCNC: 8.7 MG/DL (ref 8.3–10.6)
CHLORIDE BLD-SCNC: 102 MMOL/L (ref 99–110)
CO2: 27 MMOL/L (ref 21–32)
CREAT SERPL-MCNC: 0.9 MG/DL (ref 0.9–1.3)
EOSINOPHILS ABSOLUTE: 0 K/UL (ref 0–0.6)
EOSINOPHILS RELATIVE PERCENT: 0.1 %
GFR AFRICAN AMERICAN: >60
GFR NON-AFRICAN AMERICAN: >60
GLUCOSE BLD-MCNC: 107 MG/DL (ref 70–99)
GLUCOSE BLD-MCNC: 114 MG/DL (ref 70–99)
GLUCOSE BLD-MCNC: 124 MG/DL (ref 70–99)
GLUCOSE BLD-MCNC: 85 MG/DL (ref 70–99)
GLUCOSE BLD-MCNC: 85 MG/DL (ref 70–99)
GLUCOSE BLD-MCNC: 86 MG/DL (ref 70–99)
GLUCOSE BLD-MCNC: 98 MG/DL (ref 70–99)
HCT VFR BLD CALC: 40.5 % (ref 40.5–52.5)
HEMOGLOBIN: 13.8 G/DL (ref 13.5–17.5)
LYMPHOCYTES ABSOLUTE: 1.5 K/UL (ref 1–5.1)
LYMPHOCYTES RELATIVE PERCENT: 12.8 %
MCH RBC QN AUTO: 29.6 PG (ref 26–34)
MCHC RBC AUTO-ENTMCNC: 34.1 G/DL (ref 31–36)
MCV RBC AUTO: 86.8 FL (ref 80–100)
MONOCYTES ABSOLUTE: 1.3 K/UL (ref 0–1.3)
MONOCYTES RELATIVE PERCENT: 11.6 %
NEUTROPHILS ABSOLUTE: 8.5 K/UL (ref 1.7–7.7)
NEUTROPHILS RELATIVE PERCENT: 75.1 %
PDW BLD-RTO: 13.6 % (ref 12.4–15.4)
PERFORMED ON: ABNORMAL
PERFORMED ON: ABNORMAL
PERFORMED ON: NORMAL
PLATELET # BLD: 259 K/UL (ref 135–450)
PMV BLD AUTO: 6.7 FL (ref 5–10.5)
POTASSIUM REFLEX MAGNESIUM: 4 MMOL/L (ref 3.5–5.1)
RBC # BLD: 4.67 M/UL (ref 4.2–5.9)
SODIUM BLD-SCNC: 137 MMOL/L (ref 136–145)
WBC # BLD: 11.3 K/UL (ref 4–11)

## 2020-10-29 PROCEDURE — 6370000000 HC RX 637 (ALT 250 FOR IP): Performed by: SURGERY

## 2020-10-29 PROCEDURE — 85025 COMPLETE CBC W/AUTO DIFF WBC: CPT

## 2020-10-29 PROCEDURE — 80048 BASIC METABOLIC PNL TOTAL CA: CPT

## 2020-10-29 PROCEDURE — 1200000000 HC SEMI PRIVATE

## 2020-10-29 PROCEDURE — 6360000002 HC RX W HCPCS: Performed by: SURGERY

## 2020-10-29 PROCEDURE — 99024 POSTOP FOLLOW-UP VISIT: CPT | Performed by: SURGERY

## 2020-10-29 PROCEDURE — 2580000003 HC RX 258: Performed by: SURGERY

## 2020-10-29 RX ADMIN — ENOXAPARIN SODIUM 40 MG: 40 INJECTION SUBCUTANEOUS at 09:29

## 2020-10-29 RX ADMIN — OXYCODONE 10 MG: 5 TABLET ORAL at 11:20

## 2020-10-29 RX ADMIN — OXYCODONE 10 MG: 5 TABLET ORAL at 06:48

## 2020-10-29 RX ADMIN — SODIUM CHLORIDE: 9 INJECTION, SOLUTION INTRAVENOUS at 06:48

## 2020-10-29 RX ADMIN — SODIUM CHLORIDE, PRESERVATIVE FREE 10 ML: 5 INJECTION INTRAVENOUS at 20:52

## 2020-10-29 RX ADMIN — MORPHINE SULFATE 4 MG: 4 INJECTION, SOLUTION INTRAMUSCULAR; INTRAVENOUS at 15:26

## 2020-10-29 RX ADMIN — FAMOTIDINE 20 MG: 20 TABLET ORAL at 09:29

## 2020-10-29 RX ADMIN — ACETAMINOPHEN 650 MG: 325 TABLET ORAL at 20:57

## 2020-10-29 RX ADMIN — OXYCODONE 10 MG: 5 TABLET ORAL at 17:04

## 2020-10-29 RX ADMIN — SODIUM CHLORIDE: 9 INJECTION, SOLUTION INTRAVENOUS at 18:20

## 2020-10-29 RX ADMIN — CEFAZOLIN SODIUM 2 G: 10 INJECTION, POWDER, FOR SOLUTION INTRAVENOUS at 06:49

## 2020-10-29 ASSESSMENT — PAIN SCALES - GENERAL
PAINLEVEL_OUTOF10: 7
PAINLEVEL_OUTOF10: 5
PAINLEVEL_OUTOF10: 7
PAINLEVEL_OUTOF10: 5
PAINLEVEL_OUTOF10: 9
PAINLEVEL_OUTOF10: 0
PAINLEVEL_OUTOF10: 6
PAINLEVEL_OUTOF10: 8

## 2020-10-29 ASSESSMENT — PAIN DESCRIPTION - LOCATION: LOCATION: HEAD

## 2020-10-29 ASSESSMENT — PAIN DESCRIPTION - PAIN TYPE
TYPE: SURGICAL PAIN
TYPE: ACUTE PAIN

## 2020-10-29 ASSESSMENT — PAIN DESCRIPTION - DESCRIPTORS: DESCRIPTORS: THROBBING

## 2020-10-29 NOTE — CARE COORDINATION
Chart reviewed. Notes reflect IPTA. Patient has PCP and payor source. Likely no needs. Please notify CM if discharge needs arise.       Yulisa Schulte RN

## 2020-10-29 NOTE — PROGRESS NOTES
[facility] GENERAL SURGERY    Surgery Progress Note           POD # 1    PATIENT NAME: Daisha Cisneros     TODAY'S DATE: 10/29/2020    INTERVAL HISTORY:    Pt  Resting comfortably, adequate pain control. Has passed flatus this AM.  Taking some clears but not hungry. OBJECTIVE:   VITALS:  /78   Pulse 64   Temp 98.3 °F (36.8 °C) (Oral)   Resp 17   Ht 5' 6\" (1.676 m)   Wt 209 lb (94.8 kg)   SpO2 93%   BMI 33.73 kg/m²     INTAKE/OUTPUT:    I/O last 3 completed shifts: In: 6997 [P.O.:240; I.V.:3100]  Out: 3355 [Urine:2750; Drains:505; Blood:100]  No intake/output data recorded. CONSTITUTIONAL:  awake and alert  LUNGS:  clear to auscultation  ABDOMEN:   hypoactive bowel sounds, soft, non-distended,     INCISION: clean, dry, no drainage, sero-sanguinous drainage    Data:  CBC:   Recent Labs     10/28/20  0945 10/29/20  0501   WBC 7.3 11.3*   HGB 15.6 13.8   HCT 45.5 40.5    259     BMP:    Recent Labs     10/28/20  0945 10/29/20  0501    137   K 3.5 4.0    102   CO2 23 27   BUN 11 7   CREATININE 0.9 0.9   GLUCOSE 96 114*     Hepatic: No results for input(s): AST, ALT, ALB, BILITOT, ALKPHOS in the last 72 hours. Mag:    No results for input(s): MG in the last 72 hours. Phos:   No results for input(s): PHOS in the last 72 hours. INR: No results for input(s): INR in the last 72 hours.       Radiology Review:       ASSESSMENT AND PLAN:  54 y.o. male status post robotic sigmoidectomy for diverticulitis   - cont clears today   - up OOB to chair   - d/c Mary   - Larry - cont Pepcid, Lovenox           Electronically signed by Carissa Garcia MD

## 2020-10-29 NOTE — PROGRESS NOTES
Pt. Resting in bed. Alert/oriented. Vitals and assessment stable as charted. Denies nausea; tolerating clear liquids in small amounts. BS hypoactive; states passed small amt flatus earlier this AM.  Plan to up to chair this AM.  Call light in reach. Will continue to monitor.

## 2020-10-29 NOTE — ANESTHESIA POSTPROCEDURE EVALUATION
Department of Anesthesiology  Postprocedure Note    Patient: Tammy Osorio  MRN: [id number]  YOB: 1975  Date of evaluation: 10/28/2020    Procedure Summary     Date:  10/28/20 Room / Location:  [address] [place] [address]    Anesthesia Start:  3884 Anesthesia Stop:  1298    Procedure:  ROBOTIC SIGMOIDECTOMY (N/A Abdomen) Diagnosis:       Diverticulitis of large intestine with perforation without bleeding      (Diverticulitis of large intestine with perforation without bleeding)    Surgeon:  Jaja Lee MD Responsible Provider:  Va Beaver MD    Anesthesia Type:  general ASA Status:  2        Anesthesia Type: general    Angela Phase I: Angela Score: 10    Angela Phase II:      Last vitals: Reviewed and per EMR flowsheets.      Anesthesia Post Evaluation   Anesthetic Problems: no   Cardiovascular System Stable: yes  Respiratory Function: Airway Patent yes  ETT no  Ventilator no  Level of consciousness: awake, alert and oriented  Post-op pain: adequate analgesia  Hydration Adequate: yes  Nausea/Vomiting:no  Other Issues:     Fletcher García MD

## 2020-10-29 NOTE — PROGRESS NOTES
Pt admitted to room 324 from PACU. Pt oriented to room. Wife @ bedside. Pt a/o. VSS. 4 eye skin assessment completed in PACU. Pt c/o 8/10 pain. PRN medication administered per MAR. Hernandez catheter in place and patent. GRACIE drain to bulb suction, draining bright red blood. Abd surgical incisions C/D/I. Bed in lowest position and wheels locked. Call light within reach. Bedside table within reach. Pt calls out appropriately. Bilateral SCD's in place. Will continue to monitor.

## 2020-10-29 NOTE — PLAN OF CARE
Problem: Pain:  Goal: Control of acute pain  Description: Control of acute pain  Outcome: Ongoing    Pt is rating pain 8/10. Pt medicated with PRN Oxycodone as ordered. Will continue to monitor.

## 2020-10-29 NOTE — PROGRESS NOTES
Pt is alert and oriented x 4. Vitals signs are stable. Assessment is as charted. Bowel sounds are active. Pt denies further needs at this time. Will continue to monitor.

## 2020-10-29 NOTE — OP NOTE
[facility] [address], [place] [address]                                OPERATIVE REPORT    PATIENT NAME: Alejandrina Bernard                :        1975  MED REC NO:   [id number]                          ROOM:       0416  ACCOUNT NO:   [de-identified]                           ADMIT DATE: 10/28/2020  PROVIDER:     Medhat Richardson MD    DATE OF PROCEDURE:  10/28/2020    PREOPERATIVE DIAGNOSIS:  History of diverticulitis. POSTOPERATIVE DIAGNOSIS:  History of diverticulitis. PROCEDURE PERFORMED:  Robotic sigmoidectomy with stapled anastomosis. SURGEON:  Medhat Richardson MD    ANESTHESIA:  General.    ESTIMATED BLOOD LOSS:  Less than 50 mL. SPECIMEN:  Sigmoid colon, open end equals proximal margin. COUNTS:  Sponge and needle counts were correct. INDICATIONS:  The patient is a 67-year-old male who has had several  prior episodes of acute diverticulitis, who presents now for an interval  elective sigmoid resection. FINDINGS:  1.  Mild residual inflammation of the distal sigmoid, with extensive and  bulky epiploic appendages. 2.  Robotic sigmoid with stapled 29 mm EEA anastomosis. 3.  Small air leak seen initially on testing, oversewn with no further  leak seen. DESCRIPTION OF THE PROCEDURE:  After informed consent was obtained, the  patient was taken to the operating room and placed in the supine  position. Preoperative antibiotics had been initiated in the holding  area. Athrombic pumps were placed in the legs. General anesthesia was  administered without difficulty. The Hernandez catheter was placed to  gravity in a sterile fashion. The patient was now placed in the low  lithotomy position with the appropriate padding to all pressure points. The abdomen and the perineum were prepped and draped in a sterile  fashion. We began with a trocar incision in the right upper quadrant  just below the costal margin.   The trocar was inserted under direct  vision into the abdominal cavity. Insufflation was initiated. There  were no anterior abdominal wall adhesions. We proceeded to place three  more trocars across the abdomen, beginning with the 12 mm trocar in the  right lower quadrant, and two 8 mm trocars placed at the supraumbilical  midline in the left upper quadrant. The bed was positioned to expose  the pelvis. The sigmoid was evaluated and we could see evidence of some  residual inflammation with some adherence to the left lateral sidewall. The colon itself was notable for having fairly bulky epiploic fatty  attachments along its entire length on the left side. We began first by  mobilizing the distal sigmoid releasing some of the old inflammatory  adhesions on either side. Lifting up, we could see a healthy soft  rectum and the rectosigmoid junction was clearly identified. We then  continued mobilizing the colon laterally up along the descending colon  towards the splenic flexure. In the mobilization process, we were able  to identify and preserve the left ureter with no difficulty. We now proceeded to dissect the mesentery at the junction of the sigmoid  and rectum. Once we had this dissected free and cleared away, the  robotic linear stapler was used to transect the rectosigmoid junction. We then used a vessel sealing device to begin taking the mesentery  underneath the colon working proximally. I identified the spot on the  proximal sigmoid region, where it appeared to be healthy with no sign of  further inflammatory change. This was our planned proximal transection  site. Once I had the specimen adequately mobilized, we planned for  removal.    The robot was undocked. A 7 cm periumbilical midline incision was  opened sharply and a wound protector was inserted. We now able to bring  the distal colon up to the wound and identified the planned proximal  transection site.   The colon was transected across this area sharply and  passed off. The lumen of the colon was dilated up to 29 mm without  difficulty. A pursestring suture device was placed across the cut end  of the colon and the anvil of a 29 mm stapler was inserted into the  lumen of the colon. The pursestring suture was secured snugly around  the base of the anvil. The end of the colon over the anvil site was  cleared off some of the heavy fatty attachments; however, still in  place. We then proceeded to drop the proximal colon back into the  abdominal cavity and closed the wound protector. Returning to the robot, I was able to first lift up the rectum and  cleared off some final mesenteric attachments. The assistant then went  below to the perineum and gently passed the dilators up to the end of  the rectum without difficulty. The proximal colon laid easily down in  the pelvis adjacent to the rectum. The stapler was now inserted into  the rectum and brought up to the proximal end, but it did not quite  reach to the very flush end of the rectum. Rather it positioned best on  the anterior wall of the rectum just proximal to the staple line. In  positioning the stapler against the anterior wall of the rectum, I did  cause a small linear tear in the rectal wall by the way the assistant  passed the spike of the stapler out through the rectal wall. I oversewn  this small linear tear with a 2-0 silk suture. We then proceeded to  connect the two ends of the colon with a stapler and closed the stapler  down and fired it. The stapler was removed. Checking on the back  table, there were two intact doughnuts of colon tissue from the proximal  and distal ends. These were sent with the colon specimen. We now  proceeded to perform a standard leak test.  We filled the pelvis with  saline internally and the proctoscope was inserted transanally. Insufflation was carried across the anastomosis.   It was somewhat  difficult to see the anastomosis clearly because of the elevated bulky  fatty attachments on either side of his colon and rectum, but ultimately  we had a good view of the anastomosis while underwater and we did see a  very thin trickle of bubbles coming up off the right side of the  anastomosis. I therefore, redocked the robot and was able to place  several 2-0 silk sutures around the anterior right side of the  anastomosis to reinforce the area. We then proceeded to repeat a leak  test and at this time no bubbling was noted on repeated efforts. I  determined the anastomosis to be intact at this time. The fluid from the pelvis was suctioned free. Hemostasis appeared  excellent. There was no tension on the anastomosis noted. I did use  the immunofluorescent dye to confirm adequate perfusion of the proximal  colon prior to completing the anastomosis. We did place a suction drain  through the left upper quadrant trocar and positioned it down in the  pelvis surrounding the anastomosis as a precaution. The robot was then  undocked a final time. The trocars were all removed as was the wound  protector. The midline fascial incision was closed with two running  looped 0 PDS sutures. The 12 mm fascial defect was closed with a  figure-of-eight 0 Vicryl suture. The skin incisions were all closed  with a combination of 3-0 Vicryl subcutaneous sutures, followed by a 4-0  Monocryl subcuticular stitch and Dermabond. The patient was then  extubated and taken to the recovery room in stable condition.         Char Amaya MD    D: 10/29/2020 9:16:42       T: 10/29/2020 11:36:41     [id number]  Job#: [id number]     Doc#: [id number]    CC:

## 2020-10-30 LAB
BASOPHILS ABSOLUTE: 0 K/UL (ref 0–0.2)
BASOPHILS RELATIVE PERCENT: 0.4 %
EOSINOPHILS ABSOLUTE: 0.2 K/UL (ref 0–0.6)
EOSINOPHILS RELATIVE PERCENT: 2.1 %
GLUCOSE BLD-MCNC: 103 MG/DL (ref 70–99)
GLUCOSE BLD-MCNC: 112 MG/DL (ref 70–99)
GLUCOSE BLD-MCNC: 78 MG/DL (ref 70–99)
GLUCOSE BLD-MCNC: 91 MG/DL (ref 70–99)
GLUCOSE BLD-MCNC: 94 MG/DL (ref 70–99)
GLUCOSE BLD-MCNC: 96 MG/DL (ref 70–99)
HCT VFR BLD CALC: 40.6 % (ref 40.5–52.5)
HEMOGLOBIN: 13.9 G/DL (ref 13.5–17.5)
LYMPHOCYTES ABSOLUTE: 1.7 K/UL (ref 1–5.1)
LYMPHOCYTES RELATIVE PERCENT: 16.7 %
MCH RBC QN AUTO: 29.9 PG (ref 26–34)
MCHC RBC AUTO-ENTMCNC: 34.3 G/DL (ref 31–36)
MCV RBC AUTO: 87.2 FL (ref 80–100)
MONOCYTES ABSOLUTE: 1.3 K/UL (ref 0–1.3)
MONOCYTES RELATIVE PERCENT: 12.7 %
NEUTROPHILS ABSOLUTE: 6.8 K/UL (ref 1.7–7.7)
NEUTROPHILS RELATIVE PERCENT: 68.1 %
PDW BLD-RTO: 13.1 % (ref 12.4–15.4)
PERFORMED ON: ABNORMAL
PERFORMED ON: ABNORMAL
PERFORMED ON: NORMAL
PLATELET # BLD: 225 K/UL (ref 135–450)
PMV BLD AUTO: 7.1 FL (ref 5–10.5)
RBC # BLD: 4.66 M/UL (ref 4.2–5.9)
WBC # BLD: 9.9 K/UL (ref 4–11)

## 2020-10-30 PROCEDURE — 99024 POSTOP FOLLOW-UP VISIT: CPT | Performed by: SURGERY

## 2020-10-30 PROCEDURE — 97161 PT EVAL LOW COMPLEX 20 MIN: CPT

## 2020-10-30 PROCEDURE — 97530 THERAPEUTIC ACTIVITIES: CPT

## 2020-10-30 PROCEDURE — 97165 OT EVAL LOW COMPLEX 30 MIN: CPT

## 2020-10-30 PROCEDURE — 2580000003 HC RX 258: Performed by: SURGERY

## 2020-10-30 PROCEDURE — 85025 COMPLETE CBC W/AUTO DIFF WBC: CPT

## 2020-10-30 PROCEDURE — 6370000000 HC RX 637 (ALT 250 FOR IP): Performed by: SURGERY

## 2020-10-30 PROCEDURE — 6360000002 HC RX W HCPCS: Performed by: SURGERY

## 2020-10-30 PROCEDURE — 1200000000 HC SEMI PRIVATE

## 2020-10-30 PROCEDURE — 36415 COLL VENOUS BLD VENIPUNCTURE: CPT

## 2020-10-30 RX ADMIN — ACETAMINOPHEN 650 MG: 325 TABLET ORAL at 20:25

## 2020-10-30 RX ADMIN — SODIUM CHLORIDE, PRESERVATIVE FREE 10 ML: 5 INJECTION INTRAVENOUS at 10:26

## 2020-10-30 RX ADMIN — SODIUM CHLORIDE: 9 INJECTION, SOLUTION INTRAVENOUS at 09:12

## 2020-10-30 RX ADMIN — ACETAMINOPHEN 650 MG: 325 TABLET ORAL at 05:54

## 2020-10-30 RX ADMIN — SODIUM CHLORIDE, PRESERVATIVE FREE 10 ML: 5 INJECTION INTRAVENOUS at 20:25

## 2020-10-30 RX ADMIN — FAMOTIDINE 20 MG: 20 TABLET ORAL at 10:07

## 2020-10-30 RX ADMIN — ENOXAPARIN SODIUM 40 MG: 40 INJECTION SUBCUTANEOUS at 10:07

## 2020-10-30 RX ADMIN — OXYCODONE 5 MG: 5 TABLET ORAL at 20:25

## 2020-10-30 ASSESSMENT — PAIN SCALES - GENERAL
PAINLEVEL_OUTOF10: 5
PAINLEVEL_OUTOF10: 3
PAINLEVEL_OUTOF10: 3
PAINLEVEL_OUTOF10: 5

## 2020-10-30 ASSESSMENT — PAIN DESCRIPTION - PAIN TYPE
TYPE: ACUTE PAIN
TYPE: ACUTE PAIN
TYPE: ACUTE PAIN;SURGICAL PAIN

## 2020-10-30 ASSESSMENT — PAIN DESCRIPTION - LOCATION
LOCATION: ABDOMEN

## 2020-10-30 ASSESSMENT — PAIN DESCRIPTION - ORIENTATION: ORIENTATION: MID

## 2020-10-30 NOTE — PROGRESS NOTES
Pt alert and oriented, VSS. Shift assessment completed and documented. Pt tolerating clears, passing gas, active bowel sounds. Denies any needs at this time. Will continue to monitor.

## 2020-10-30 NOTE — PROGRESS NOTES
Physical Therapy    Facility/Department: [facility] C3 TELE/MED SURG/ONC  Initial Assessment/DC summary     NAME: Emil Chapman  : 1975  MRN: [id number]    Date of Service: 10/30/2020    Discharge Recommendations:  24 hour supervision or assist   PT Equipment Recommendations  Equipment Needed: No    Assessment   Body structures, Functions, Activity limitations: Decreased functional mobility   Assessment: Pt functioning below baseline following surgery: Robotic sigmoidectomy with stapled anastomosis. Limited by pain, however still Independent with ambulation and able to manage stairs with supervision. No increase in pain with activity only with coughing (as expected). Pt demonstrating safe mobility. No further needs for acute PT. Treatment Diagnosis: decreased mobility  Prognosis: Excellent  Decision Making: Low Complexity  PT Education: Goals; General Safety;Gait Training;Disease Specific Education;PT Role;Precautions; Energy Conservation  Patient Education: Pt expressed understanding on benefits of OOB activity  Barriers to Learning: none  REQUIRES PT FOLLOW UP: No  Activity Tolerance  Activity Tolerance: Patient Tolerated treatment well;Patient limited by pain       Patient Diagnosis(es): The encounter diagnosis was Diverticulitis of large intestine with perforation without bleeding. has a past medical history of Diverticulitis of large intestine and PONV (postoperative nausea and vomiting). has a past surgical history that includes Shoulder arthroscopy (Bilateral); Colonoscopy (N/A, 2020); and Small intestine surgery (N/A, 10/28/2020).     Restrictions  Restrictions/Precautions  Restrictions/Precautions: General Precautions, Fall Risk  Position Activity Restriction  Other position/activity restrictions: up in chair  Vision/Hearing        Subjective  General  Chart Reviewed: Yes  Patient assessed for rehabilitation services?: Yes  Response To Previous Treatment: Not applicable  Family / Caregiver 6\"  Rails: None  Assistance: Supervision     Balance  Posture: Good  Sitting - Static: Good  Sitting - Dynamic: Good  Standing - Static: Good  Standing - Dynamic: Good        Plan   Plan  Times per week: DC  Safety Devices  Type of devices: Call light within reach, Nurse notified, Left in chair  Restraints  Initially in place: No      Goals  Short term goals  Time Frame for Short term goals: 1 session  Short term goal 1: Pt will demosntrate safe mobility at supervision level.  MET  Patient Goals   Patient goals : to go home       Therapy Time   Individual Concurrent Group Co-treatment   Time In 0905         Time Out 0925         Minutes 20         Timed Code Treatment Minutes: Eunice Hurtado 115, PT

## 2020-10-30 NOTE — PLAN OF CARE
Bed locked and in low position, bedside table and call light within reach, nonskid socks on, up independently with steady gait. Verbalizes adequate pain relief with meds as ordered.   Problem: Pain:  Goal: Pain level will decrease  Description: Pain level will decrease  Outcome: Ongoing  Goal: Control of acute pain  Description: Control of acute pain  Outcome: Ongoing  Goal: Control of chronic pain  Description: Control of chronic pain  Outcome: Ongoing     Problem: Falls - Risk of:  Goal: Will remain free from falls  Description: Will remain free from falls  Outcome: Ongoing  Goal: Absence of physical injury  Description: Absence of physical injury  Outcome: Ongoing

## 2020-10-30 NOTE — PROGRESS NOTES
[facility] GENERAL SURGERY    Surgery Progress Note           POD # 2    PATIENT NAME: Yasmin Pacheco     TODAY'S DATE: 10/30/2020    INTERVAL HISTORY:    Pt  Nancy Oh is a 54year old male post op 2 days for sigmoidectomy. Today he is doing well and states his pain has decreased considerably. He currently rates his pain a 3/10 with rest and a 7/10 with movement. He has not had a bowel movement yet but has been passing gas. He has only been drinking water but will be incorporating other foods into his diet to help aid in a bowel movement. He had concerns about his drain and if he could shower with it on. He Denies nausea, vomiting, fever, or chills. OBJECTIVE:   VITALS:  BP (!) 148/96   Pulse 87   Temp 98.3 °F (36.8 °C) (Oral)   Resp 18   Ht 5' 6\" (1.676 m)   Wt 209 lb (94.8 kg)   SpO2 92%   BMI 33.73 kg/m²     INTAKE/OUTPUT:    I/O last 3 completed shifts: In: 6412 [P.O.:1960; I.V.:4452]  Out: 7574 [Urine:4075; Drains:100]  I/O this shift:  In: -   Out: 570 [Urine:540; Drains:30]              CONSTITUTIONAL:  awake and alert  LUNGS:  clear to auscultation, no crackles or wheezing  ABDOMEN:   normal bowel sounds, soft, non-distended, tenderness noted diffusely   INCISION: clean, dry, no drainage, healing    Data:  CBC:   Recent Labs     10/28/20  0945 10/29/20  0501 10/30/20  0549   WBC 7.3 11.3* 9.9   HGB 15.6 13.8 13.9   HCT 45.5 40.5 40.6    259 225     BMP:    Recent Labs     10/28/20  0945 10/29/20  0501    137   K 3.5 4.0    102   CO2 23 27   BUN 11 7   CREATININE 0.9 0.9   GLUCOSE 96 114*     Hepatic: No results for input(s): AST, ALT, ALB, BILITOT, ALKPHOS in the last 72 hours. Mag:    No results for input(s): MG in the last 72 hours. Phos:   No results for input(s): PHOS in the last 72 hours. INR: No results for input(s): INR in the last 72 hours.       Radiology Review:     ASSESSMENT AND PLAN:  54 y.o. male status post sigmoidectomy    Take pain medication as prescribed  Start incorporating small amounts of solid food  Keep incision clean and dry  Avoid strenuous tasks  May be discharged home in the next day or two if pt doing well  Only have sponge baths to avoid getting incisions and drain wet  Pt understands A and P        Oneil Shoulder seen and discussed under supervision of preceptor Dr. Connie Helton MD. Note made by medical student MSIII, in the status of a scribe with review by preceptor.      Electronically signed by Radha Scott

## 2020-10-30 NOTE — PROGRESS NOTES
Occupational Therapy   Occupational Therapy Initial Assessment/Treatment/Discharge  1x only  Date: 10/30/2020   Patient Name: Karon Sims  MRN: [id number]     : 1975    Date of Service: 10/30/2020    Discharge Recommendations:  Home with assist PRN       Assessment   Assessment: Pt 53 yo male functioning with deficits in the areas listed above ROBOTIC SIGMOIDECTOMY on 10/28. Pt reports IND PLOF and lives at home with family. Pt is currently close to baseline and demo'd fair pain management. Pt educated on task management and adaptations. pt is currently at an Ind level for all adls. No further OT needs at this time. Please re-order if there is a change in status. Prognosis: Good  Decision Making: Low Complexity  OT Education: OT Role;Energy Conservation  REQUIRES OT FOLLOW UP: No  Activity Tolerance  Activity Tolerance: Patient Tolerated treatment well  Safety Devices  Safety Devices in place: Yes  Type of devices: Call light within reach;Nurse notified; Left in chair           Patient Diagnosis(es): The encounter diagnosis was Diverticulitis of large intestine with perforation without bleeding. has a past medical history of Diverticulitis of large intestine and PONV (postoperative nausea and vomiting). has a past surgical history that includes Shoulder arthroscopy (Bilateral); Colonoscopy (N/A, 2020); and Small intestine surgery (N/A, 10/28/2020).            Restrictions  Restrictions/Precautions  Restrictions/Precautions: General Precautions, Fall Risk  Position Activity Restriction  Other position/activity restrictions: up in chair    Subjective   General  Chart Reviewed: Yes  Patient assessed for rehabilitation services?: Yes  Family / Caregiver Present: No  Referring Practitioner: Gina Mack MD  Diagnosis: ROBOTIC SIGMOIDECTOMY on 10/28,  Subjective  Subjective: Pt agreeable to therapy  General Comment  Comments: RN approved therapy  Patient Currently in Pain: Yes  Pain

## 2020-10-31 LAB
GLUCOSE BLD-MCNC: 105 MG/DL (ref 70–99)
GLUCOSE BLD-MCNC: 112 MG/DL (ref 70–99)
GLUCOSE BLD-MCNC: 93 MG/DL (ref 70–99)
GLUCOSE BLD-MCNC: 94 MG/DL (ref 70–99)
GLUCOSE BLD-MCNC: 94 MG/DL (ref 70–99)
GLUCOSE BLD-MCNC: 96 MG/DL (ref 70–99)
PERFORMED ON: ABNORMAL
PERFORMED ON: ABNORMAL
PERFORMED ON: NORMAL

## 2020-10-31 PROCEDURE — 6360000002 HC RX W HCPCS: Performed by: SURGERY

## 2020-10-31 PROCEDURE — 2580000003 HC RX 258: Performed by: SURGERY

## 2020-10-31 PROCEDURE — 6370000000 HC RX 637 (ALT 250 FOR IP): Performed by: SURGERY

## 2020-10-31 PROCEDURE — 99024 POSTOP FOLLOW-UP VISIT: CPT | Performed by: SURGERY

## 2020-10-31 PROCEDURE — 1200000000 HC SEMI PRIVATE

## 2020-10-31 RX ADMIN — SODIUM CHLORIDE: 9 INJECTION, SOLUTION INTRAVENOUS at 22:11

## 2020-10-31 RX ADMIN — MORPHINE SULFATE 2 MG: 2 INJECTION, SOLUTION INTRAMUSCULAR; INTRAVENOUS at 22:45

## 2020-10-31 RX ADMIN — OXYCODONE 5 MG: 5 TABLET ORAL at 19:53

## 2020-10-31 RX ADMIN — FAMOTIDINE 20 MG: 20 TABLET ORAL at 08:55

## 2020-10-31 RX ADMIN — ACETAMINOPHEN 650 MG: 325 TABLET ORAL at 19:53

## 2020-10-31 RX ADMIN — ONDANSETRON 4 MG: 2 INJECTION INTRAMUSCULAR; INTRAVENOUS at 22:45

## 2020-10-31 RX ADMIN — ENOXAPARIN SODIUM 40 MG: 40 INJECTION SUBCUTANEOUS at 08:55

## 2020-10-31 ASSESSMENT — PAIN DESCRIPTION - LOCATION: LOCATION: ABDOMEN

## 2020-10-31 ASSESSMENT — PAIN DESCRIPTION - PAIN TYPE: TYPE: SURGICAL PAIN;ACUTE PAIN

## 2020-10-31 ASSESSMENT — PAIN SCALES - GENERAL
PAINLEVEL_OUTOF10: 5
PAINLEVEL_OUTOF10: 5
PAINLEVEL_OUTOF10: 3

## 2020-10-31 ASSESSMENT — PAIN DESCRIPTION - ORIENTATION: ORIENTATION: MID

## 2020-10-31 NOTE — PROGRESS NOTES
Instructed on emptying GRACIE drain, completed task without difficulty. Verbalized having had 1 mucousy red stool today.

## 2020-10-31 NOTE — PROGRESS NOTES
[facility] GENERAL SURGERY    Surgery Progress Note           POD # 3    PATIENT NAME: Tawnya De La Garza     TODAY'S DATE: 10/31/2020    INTERVAL HISTORY:    Pt feels okay today. He has passed a little bit of gas in some mucus but has not had a bowel movement. His pain is controlled. OBJECTIVE:   VITALS:  BP (!) 160/97   Pulse 79   Temp 98.3 °F (36.8 °C) (Oral)   Resp 16   Ht 5' 6\" (1.676 m)   Wt 209 lb (94.8 kg)   SpO2 96%   BMI 33.73 kg/m²     INTAKE/OUTPUT:    I/O last 3 completed shifts: In: 2469 [P.O.:960; I.V.:1509]  Out: 650 [Urine:540; Drains:110]  No intake/output data recorded. CONSTITUTIONAL:  awake and alert  LUNGS:  clear to auscultation  ABDOMEN:   hypoactive bowel sounds, soft, non-distended, GRACIE with serosanguineous drainage  INCISION: clean, dry    Data:  CBC:   Recent Labs     10/29/20  0501 10/30/20  0549   WBC 11.3* 9.9   HGB 13.8 13.9   HCT 40.5 40.6    225     BMP:    Recent Labs     10/29/20  0501      K 4.0      CO2 27   BUN 7   CREATININE 0.9   GLUCOSE 114*     Hepatic: No results for input(s): AST, ALT, ALB, BILITOT, ALKPHOS in the last 72 hours. Mag:    No results for input(s): MG in the last 72 hours. Phos:   No results for input(s): PHOS in the last 72 hours. INR: No results for input(s): INR in the last 72 hours. Radiology Review: N/A    ASSESSMENT AND PLAN:  54 y.o. male status post sigmoid colectomy. Encourage activity and incentive spirometry use. Await better GI function for discharge.         Electronically signed by Cece Suggs MD

## 2020-10-31 NOTE — PLAN OF CARE
Bed locked and in low position, bedside table and call light within reach, nonskid socks on, up independently in room with steady gait. Verbalizes adequate pain relief with meds as ordered.   Problem: Pain:  Goal: Pain level will decrease  Description: Pain level will decrease  Outcome: Ongoing  Goal: Control of acute pain  Description: Control of acute pain  Outcome: Ongoing  Goal: Control of chronic pain  Description: Control of chronic pain  Outcome: Ongoing     Problem: Falls - Risk of:  Goal: Will remain free from falls  Description: Will remain free from falls  Outcome: Ongoing  Goal: Absence of physical injury  Description: Absence of physical injury  Outcome: Ongoing

## 2020-11-01 LAB
GLUCOSE BLD-MCNC: 91 MG/DL (ref 70–99)
GLUCOSE BLD-MCNC: 95 MG/DL (ref 70–99)
GLUCOSE BLD-MCNC: 97 MG/DL (ref 70–99)
PERFORMED ON: NORMAL

## 2020-11-01 PROCEDURE — 6370000000 HC RX 637 (ALT 250 FOR IP): Performed by: SURGERY

## 2020-11-01 PROCEDURE — 1200000000 HC SEMI PRIVATE

## 2020-11-01 PROCEDURE — 99024 POSTOP FOLLOW-UP VISIT: CPT | Performed by: SURGERY

## 2020-11-01 PROCEDURE — 2580000003 HC RX 258: Performed by: SURGERY

## 2020-11-01 PROCEDURE — 6360000002 HC RX W HCPCS: Performed by: SURGERY

## 2020-11-01 RX ADMIN — SODIUM CHLORIDE: 9 INJECTION, SOLUTION INTRAVENOUS at 08:04

## 2020-11-01 RX ADMIN — OXYCODONE 10 MG: 5 TABLET ORAL at 17:46

## 2020-11-01 RX ADMIN — SODIUM CHLORIDE, PRESERVATIVE FREE 10 ML: 5 INJECTION INTRAVENOUS at 07:58

## 2020-11-01 RX ADMIN — OXYCODONE 5 MG: 5 TABLET ORAL at 09:41

## 2020-11-01 RX ADMIN — ENOXAPARIN SODIUM 40 MG: 40 INJECTION SUBCUTANEOUS at 07:58

## 2020-11-01 RX ADMIN — FAMOTIDINE 20 MG: 20 TABLET ORAL at 07:58

## 2020-11-01 ASSESSMENT — PAIN DESCRIPTION - ORIENTATION
ORIENTATION: MID

## 2020-11-01 ASSESSMENT — PAIN SCALES - GENERAL
PAINLEVEL_OUTOF10: 3
PAINLEVEL_OUTOF10: 6
PAINLEVEL_OUTOF10: 4
PAINLEVEL_OUTOF10: 5
PAINLEVEL_OUTOF10: 2
PAINLEVEL_OUTOF10: 3

## 2020-11-01 ASSESSMENT — PAIN DESCRIPTION - LOCATION
LOCATION: ABDOMEN

## 2020-11-01 ASSESSMENT — PAIN DESCRIPTION - PAIN TYPE
TYPE: ACUTE PAIN;SURGICAL PAIN

## 2020-11-01 NOTE — PROGRESS NOTES
Pt is a/o x4. VSS (see BP below). Assessment as charted. - Pts BP was elevated at 160s/100s- pt stated he was in a moderate amount of pain- PRN Thelma and Morphine were given per MD order and his BP decreased to 130s/80s. Better pain control will be continued. PRN Zofran was given per MD order for anticipated nausea from Morphine.   - Pt has unlabored breathing w/ clear lung sounds- SpO2 >90% on RA- IS encouraged. - Pt has x3 surg lap ix- dressing C/D/I- pt is tolerating his diet well- most recent BM was clear and mucous. Pt does have a GRACIE drain w/ small amount of serosanguinous output. Pt is currently resting in his bed that is locked and in its lowest position w/ his call light within reach, non-skid socks on, and bed alarm off d/t pt being independent. Pt denies any other needs at this time. Will continue to monitor.

## 2020-11-01 NOTE — PLAN OF CARE
Problem: Pain:  Goal: Pain level will decrease  Description: Pain level will decrease  11/1/2020 0950 by Malik Moreno RN  Outcome: Ongoing  Note: Patient will remain free of pain prior to discharge. Patient will utilize 0-10 scale to describe pain as well as onset, location, and duration. Patient will utilize nonpharmacologic and pharmacolgoic pain relief methods as educated by RN. PRN pain medications will be administered per orders as needed. Will continue to monitor patient. Problem: Falls - Risk of:  Goal: Will remain free from falls  Description: Will remain free from falls  11/1/2020 0950 by Malik Moreno RN  Outcome: Ongoing  Note: Patient will remain free of falls. Patient calls out appropriately. Patient utilizes call light, bed is in lowest locked position, nonskid footwear in place. Patient's room is kept free of clutter and patient is assisted with ambulation as needed. Will continue to monitor and educate patient on safety precautions. Problem:  Bowel/Gastric:  Goal: Gastrointestinal status for postoperative course will improve  Description: Gastrointestinal status for postoperative course will improve  11/1/2020 0950 by Malik Moreno RN  Outcome: Ongoing

## 2020-11-01 NOTE — PROGRESS NOTES
[facility] GENERAL SURGERY    Surgery Progress Note           POD # 4    PATIENT NAME: Tammy Osroio     TODAY'S DATE: 11/1/2020    INTERVAL HISTORY:    Pt feels okay. He is tolerating a diet. He is passing gas and some mucus but has not had a bowel movement. OBJECTIVE:   VITALS:  BP (!) 142/96   Pulse 79   Temp 97.7 °F (36.5 °C) (Oral)   Resp 16   Ht 5' 6\" (1.676 m)   Wt 209 lb (94.8 kg)   SpO2 95%   BMI 33.73 kg/m²     INTAKE/OUTPUT:    I/O last 3 completed shifts: In: 3297.6 [P.O.:2080; I.V.:1217.6]  Out: 40 [Drains:40]  I/O this shift: In: 722.5 [P.O.:220; I.V.:502.5]  Out: -               CONSTITUTIONAL:  awake and alert  LUNGS:  clear to auscultation  ABDOMEN:   normal bowel sounds, soft, non-distended, drain with serosanguineous drainage  INCISION: clean, dry    Data:  CBC:   Recent Labs     10/30/20  0549   WBC 9.9   HGB 13.9   HCT 40.6        BMP:  No results for input(s): NA, K, CL, CO2, BUN, CREATININE, GLUCOSE in the last 72 hours. Hepatic: No results for input(s): AST, ALT, ALB, BILITOT, ALKPHOS in the last 72 hours. Mag:    No results for input(s): MG in the last 72 hours. Phos:   No results for input(s): PHOS in the last 72 hours. INR: No results for input(s): INR in the last 72 hours. Radiology Review: N/A    ASSESSMENT AND PLAN:  54 y.o. male status post sigmoid colectomy. Encourage activity and incentive spirometry use. Await better GI function for discharge.          Electronically signed by Lexy Sanders MD

## 2020-11-01 NOTE — PLAN OF CARE
Problem: Pain:  Goal: Pain level will decrease  Description: Pain level will decrease  Outcome: Ongoing  Goal: Control of acute pain  Description: Control of acute pain  Outcome: Ongoing  Goal: Control of chronic pain  Description: Control of chronic pain  Outcome: Ongoing     Problem: Activity:  Goal: Ability to return to normal activity level will improve  Description: Ability to return to normal activity level will improve  Outcome: Ongoing     Problem:  Bowel/Gastric:  Goal: Gastrointestinal status for postoperative course will improve  Description: Gastrointestinal status for postoperative course will improve  Outcome: Ongoing     Problem: Health Behavior:  Goal: Identification of resources available to assist in meeting health care needs will improve  Description: Identification of resources available to assist in meeting health care needs will improve  Outcome: Ongoing     Problem: Physical Regulation:  Goal: Postoperative complications will be avoided or minimized  Description: Postoperative complications will be avoided or minimized  Outcome: Ongoing     Problem: Respiratory:  Goal: Ability to achieve and maintain a regular respiratory rate will improve  Description: Ability to achieve and maintain a regular respiratory rate will improve  Outcome: Ongoing     Problem: Safety:  Goal: Ability to remain free from injury will improve  Description: Ability to remain free from injury will improve  Outcome: Ongoing     Problem: Sensory:  Goal: General experience of comfort will improve  Description: General experience of comfort will improve  Outcome: Ongoing     Problem: Skin Integrity:  Goal: Demonstration of wound healing without infection will improve  Description: Demonstration of wound healing without infection will improve  Outcome: Ongoing

## 2020-11-01 NOTE — PROGRESS NOTES
Patient AxO. VSS. Assessment completed as charted. Patient verbalizes pain as 3 on 0-10 scale located in mid abdomen. Patient denies any other needs or requests at this time. Patient GRACIE drain output 30ml/shift. BS active and patient having BM. Bed is in lowest locked position. Call light and bedside table is within reach. Will continue to monitor patient. Addendum 0865:  Patient verbalized increased pain as a \"4 or 5\" . BP elevated at 145/100. PRN Roxicodone administered per orders. Will continue to monitor.

## 2020-11-02 VITALS
HEIGHT: 66 IN | DIASTOLIC BLOOD PRESSURE: 105 MMHG | BODY MASS INDEX: 33.59 KG/M2 | OXYGEN SATURATION: 96 % | TEMPERATURE: 97.9 F | WEIGHT: 209 LBS | RESPIRATION RATE: 18 BRPM | HEART RATE: 77 BPM | SYSTOLIC BLOOD PRESSURE: 163 MMHG

## 2020-11-02 PROCEDURE — 6360000002 HC RX W HCPCS: Performed by: SURGERY

## 2020-11-02 PROCEDURE — 6370000000 HC RX 637 (ALT 250 FOR IP): Performed by: SURGERY

## 2020-11-02 PROCEDURE — APPSS45 APP SPLIT SHARED TIME 31-45 MINUTES: Performed by: CLINICAL NURSE SPECIALIST

## 2020-11-02 PROCEDURE — 2580000003 HC RX 258: Performed by: SURGERY

## 2020-11-02 PROCEDURE — 99024 POSTOP FOLLOW-UP VISIT: CPT | Performed by: SURGERY

## 2020-11-02 RX ORDER — OXYCODONE HYDROCHLORIDE 5 MG/1
5 TABLET ORAL EVERY 6 HOURS PRN
Qty: 24 TABLET | Refills: 0 | Status: SHIPPED | OUTPATIENT
Start: 2020-11-02 | End: 2020-11-09

## 2020-11-02 RX ADMIN — ENOXAPARIN SODIUM 40 MG: 40 INJECTION SUBCUTANEOUS at 10:16

## 2020-11-02 RX ADMIN — SODIUM CHLORIDE, PRESERVATIVE FREE 10 ML: 5 INJECTION INTRAVENOUS at 10:18

## 2020-11-02 RX ADMIN — FAMOTIDINE 20 MG: 20 TABLET ORAL at 10:16

## 2020-11-02 ASSESSMENT — PAIN SCALES - GENERAL: PAINLEVEL_OUTOF10: 3

## 2020-11-02 ASSESSMENT — PAIN DESCRIPTION - LOCATION: LOCATION: ABDOMEN

## 2020-11-02 ASSESSMENT — PAIN DESCRIPTION - FREQUENCY: FREQUENCY: CONTINUOUS

## 2020-11-02 ASSESSMENT — PAIN DESCRIPTION - PAIN TYPE: TYPE: SURGICAL PAIN

## 2020-11-02 ASSESSMENT — PAIN DESCRIPTION - DESCRIPTORS: DESCRIPTORS: DISCOMFORT

## 2020-11-02 NOTE — DISCHARGE SUMMARY
Surgery Discharge Summary    Patient Identification  Elzbieta Oh is a 54 y.o. male. :  1975  Admit Date:  10/28/2020    Discharge date:  2020                                    Disposition: home    Discharge Diagnoses: Active Problems:    S/P laparoscopic-assisted sigmoidectomy  Resolved Problems:    * No resolved hospital problems. *      Discharge condition: good    Discharge Medications:     Current Discharge Medication List      CONTINUE these medications which have NOT CHANGED    Details   Probiotic Product (PROBIOTIC-10 PO) Take 1 tablet by mouth daily                 Current Discharge Medication List      START taking these medications    Details   oxyCODONE (ROXICODONE) 5 MG immediate release tablet Take 1 tablet by mouth every 6 hours as needed for Pain for up to 7 days. Qty: 24 tablet, Refills: 0    Comments: Reduce doses taken as pain becomes manageable  Associated Diagnoses: S/P laparoscopic-assisted sigmoidectomy               Most Recent Labs:    CBC: No results for input(s): WBC, HGB, HCT, PLT in the last 72 hours. BMP:  No results for input(s): NA, K, CL, CO2, BUN, CREATININE, GLUCOSE in the last 72 hours. Hepatic: No results for input(s): AST, ALT, ALB, BILITOT, ALKPHOS in the last 72 hours. PT/INR:  No results for input(s): INR in the last 72 hours. Consults: none    Surgery: Robotic sigmoidectomy with stapled anastomosis    Patient Instructions: Activity: no heavy lifting, pushing, pulling for 4 weeks, no driving for 2 weeks or while on analgesics  Diet: As tolerated  Follow-up with Dr. Margaret Cifuentes in 1 weeks. The patient and/or family/patient representatives, were provided education regarding discharge instructions, ongoing treatment and follow-up. Details of information given to the patient may be found in the discharge instructions located in the EMR. HPI and Hospital Course:    The pt presented to the hospital for the above elective procedure following episode of acute diverticulitis. He had an uneventful recovery, and was discharged to home in good condition once he was tolerating a diet and having good GI function. For a detailed hospital course, please refer to the daily progress notes. [facility]    Patient seen and agree with above.     Otf Green MD

## 2020-11-02 NOTE — PLAN OF CARE
Problem: Pain:  Goal: Pain level will decrease  Description: Pain level will decrease  11/1/2020 2221 by Radha Buchanan RN  Outcome: Ongoing  11/1/2020 0950 by Alvin Osullivan RN  Outcome: Ongoing  Note: Patient will remain free of pain prior to discharge. Patient will utilize 0-10 scale to describe pain as well as onset, location, and duration. Patient will utilize nonpharmacologic and pharmacolgoic pain relief methods as educated by RN. PRN pain medications will be administered per orders as needed. Will continue to monitor patient. Goal: Control of acute pain  Description: Control of acute pain  Outcome: Ongoing  Goal: Control of chronic pain  Description: Control of chronic pain  Outcome: Ongoing     Problem: Activity:  Goal: Ability to return to normal activity level will improve  Description: Ability to return to normal activity level will improve  Outcome: Ongoing     Problem:  Bowel/Gastric:  Goal: Gastrointestinal status for postoperative course will improve  Description: Gastrointestinal status for postoperative course will improve  11/1/2020 2221 by Radha Buchanan RN  Outcome: Ongoing  11/1/2020 0950 by Alvin Osullivan RN  Outcome: Ongoing     Problem: Health Behavior:  Goal: Identification of resources available to assist in meeting health care needs will improve  Description: Identification of resources available to assist in meeting health care needs will improve  Outcome: Ongoing     Problem: Physical Regulation:  Goal: Postoperative complications will be avoided or minimized  Description: Postoperative complications will be avoided or minimized  Outcome: Ongoing     Problem: Respiratory:  Goal: Ability to achieve and maintain a regular respiratory rate will improve  Description: Ability to achieve and maintain a regular respiratory rate will improve  Outcome: Ongoing     Problem: Sensory:  Goal: General experience of comfort will improve  Description: General experience of comfort will improve  Outcome: Ongoing     Problem: Skin Integrity:  Goal: Demonstration of wound healing without infection will improve  Description: Demonstration of wound healing without infection will improve  Outcome: Ongoing

## 2020-11-02 NOTE — PROGRESS NOTES
[facility] GENERAL SURGERY    Surgery Progress Note           POD # 5    PATIENT NAME: Sandro Lucero     TODAY'S DATE: 11/2/2020    INTERVAL HISTORY:    Pt doing well, continues to clifton diet and is now having bowel movements. Pain is controlled. OBJECTIVE:   VITALS:  BP (!) 163/105   Pulse 77   Temp 97.9 °F (36.6 °C) (Oral)   Resp 18   Ht 5' 6\" (1.676 m)   Wt 209 lb (94.8 kg)   SpO2 96%   BMI 33.73 kg/m²     INTAKE/OUTPUT:    I/O last 3 completed shifts: In: 4196.5 [P.O.:2140; I.V.:2056.5]  Out: 1120 [Urine:1050; Drains:70]  I/O this shift:  In: -   Out: 500 [Urine:500]              CONSTITUTIONAL:  awake and alert  LUNGS:  clear to auscultation  ABDOMEN:   normal bowel sounds, soft, non-distended, drain with serosanguineous drainage  INCISION: clean, dry        ASSESSMENT AND PLAN:  54 y.o. male status post sigmoid colectomy  GI: clifton diet and having BMs. Pain: controlled. Activity: ambulate    Dispo: d/c to home today with drain in place, f/u with dr. Mcdonald [place] this Friday    Electronically signed by HONG Chavira CNP     Patient seen and agree with above.     Ajay Kumar MD

## 2020-11-03 NOTE — CARE COORDINATION
11/3/2020      CASE MANAGEMENT DISCHARGE SUMMARY    Discharge to: Home    Transportation:    Family/car: Via private car  Patient discharged home with no needs on 11/2/2020.  ELFEGO Woodson

## 2020-11-06 ENCOUNTER — OFFICE VISIT (OUTPATIENT)
Dept: SURGERY | Age: 45
End: 2020-11-06

## 2020-11-06 VITALS
DIASTOLIC BLOOD PRESSURE: 103 MMHG | BODY MASS INDEX: 33.43 KG/M2 | WEIGHT: 208 LBS | HEIGHT: 66 IN | SYSTOLIC BLOOD PRESSURE: 148 MMHG | TEMPERATURE: 97.3 F | HEART RATE: 75 BPM

## 2020-11-06 PROCEDURE — 99024 POSTOP FOLLOW-UP VISIT: CPT | Performed by: SURGERY

## 2020-11-16 ENCOUNTER — TELEPHONE (OUTPATIENT)
Dept: SURGERY | Age: 45
End: 2020-11-16

## 2020-11-16 NOTE — TELEPHONE ENCOUNTER
Patient is COVID quarantined. Needs to reschedule. Can you please advise because this is a surgery follow up.

## 2020-11-20 ENCOUNTER — VIRTUAL VISIT (OUTPATIENT)
Dept: SURGERY | Age: 45
End: 2020-11-20

## 2020-11-20 PROCEDURE — 99024 POSTOP FOLLOW-UP VISIT: CPT | Performed by: SURGERY

## 2020-11-20 RX ORDER — M-VIT,TX,IRON,MINS/CALC/FOLIC 27MG-0.4MG
1 TABLET ORAL DAILY
COMMUNITY

## 2020-11-20 RX ORDER — ASCORBIC ACID 500 MG
500 TABLET ORAL DAILY
COMMUNITY

## 2020-11-20 NOTE — PROGRESS NOTES
2020    TELEHEALTH EVALUATION -- Audio/Visual (During QHWEQ-17 public health emergency)    HPI:    Elzbieta Oh (:  1975) has requested an audio/video evaluation for the following concern(s):      Surgery Post-op Progress Note    HPI:  Notes reviewed, and agree with documentation in pt's chart. Postoperative Follow-up: Patient presents for 2 week follow-up status post robotic sigmoidectomy for diverticulitis . Eating a regular diet without difficulty. Bowel movements are Normal.  Pain is controlled without any medications. .     ROS:    · 10 point review of systems performed; please refer to HPI with pertinent positives, all other ROS are negative    PE:   CONSTITUTIONAL:  awake and alert    ABDOMEN:  , non-distended,       INCISION: clean, dry, no drainage, healing      ASSESSMENT:   Diagnosis Orders   1. Postop check     ·       PLAN:    ·        Review of Systems    Prior to Visit Medications    Medication Sig Taking?  Authorizing Provider   vitamin C (ASCORBIC ACID) 500 MG tablet Take 500 mg by mouth daily Yes Historical Provider, MD   Multiple Vitamins-Minerals (THERAPEUTIC MULTIVITAMIN-MINERALS) tablet Take 1 tablet by mouth daily Yes Historical Provider, MD   Probiotic Product (PROBIOTIC-10 PO) Take 1 tablet by mouth daily  Yes Historical Provider, MD       Social History     Tobacco Use    Smoking status: Never Smoker    Smokeless tobacco: Current User     Types: Chew   Substance Use Topics    Alcohol use: Yes     Comment: 3/wk    Drug use: No        No Known Allergies    PHYSICAL EXAMINATION:  [ INSTRUCTIONS:  \"[x]\" Indicates a positive item  \"[]\" Indicates a negative item  -- DELETE ALL ITEMS NOT EXAMINED]  Vital Signs: (As obtained by patient/caregiver or practitioner observation)    Blood pressure-  Heart rate-    Respiratory rate-    Temperature-  Pulse oximetry-     Constitutional: [] Appears well-developed and well-nourished [] No apparent distress      [] Abnormal-   Mental status  [] Alert and awake  [] Oriented to person/place/time []Able to follow commands      Eyes:  EOM    []  Normal  [] Abnormal-  Sclera  []  Normal  [] Abnormal -         Discharge []  None visible  [] Abnormal -    HENT:   [] Normocephalic, atraumatic. [] Abnormal   [] Mouth/Throat: Mucous membranes are moist.     External Ears [] Normal  [] Abnormal-     Neck: [] No visualized mass     Pulmonary/Chest: [] Respiratory effort normal.  [] No visualized signs of difficulty breathing or respiratory distress        [] Abnormal-      Musculoskeletal:   [] Normal gait with no signs of ataxia         [] Normal range of motion of neck        [] Abnormal-       Neurological:        [] No Facial Asymmetry (Cranial nerve 7 motor function) (limited exam to video visit)          [] No gaze palsy        [] Abnormal-         Skin:        [] No significant exanthematous lesions or discoloration noted on facial skin         [] Abnormal-            Psychiatric:       [] Normal Affect [] No Hallucinations        [] Abnormal-     Other pertinent observable physical exam findings-     ASSESSMENT/PLAN:  S/P partial colectomy for diverticulitis, doing very well. - cont to increase PO diet as tolerated   - gradually increase physical activities on next 1-2 week   - F/U as needed    No follow-ups on file. Daisha Cisneros is a 54 y.o. male being evaluated by a Virtual Visit (video visit) encounter to address concerns as mentioned above. A caregiver was present when appropriate. Due to this being a TeleHealth encounter (During [place] [facility] public health emergency), evaluation of the following organ systems was limited: Vitals/Constitutional/EENT/Resp/CV/GI//MS/Neuro/Skin/Heme-Lymph-Imm.   Pursuant to the emergency declaration under the [facility] waiver authority and the [email or web address] and [employer] Act, this Virtual Visit was conducted with patient's (and/or legal guardian's) consent, to reduce the patient's risk of exposure to COVID-19 and provide necessary medical care. The patient (and/or legal guardian) has also been advised to contact this office for worsening conditions or problems, and seek emergency medical treatment and/or call 911 if deemed necessary. Patient identification was verified at the start of the visit: Yes    Total time spent on this encounter: Not billed by time    Services were provided through a video synchronous discussion virtually to substitute for in-person clinic visit. Patient and provider were located at their individual homes. --Jason Ward MD on 11/20/2020 at 12:49 PM    An electronic signature was used to authenticate this note.

## 2020-12-04 ENCOUNTER — TELEPHONE (OUTPATIENT)
Dept: SURGERY | Age: 45
End: 2020-12-04

## 2020-12-04 NOTE — TELEPHONE ENCOUNTER
Called and spoke w/ pt - needed a note for his work to return on 12/10/20 and wanted to come to the office to pick it up  Gave note to  Gladis Craig) and informed pt would be in before 5pm to p/u

## 2020-12-08 ENCOUNTER — TELEPHONE (OUTPATIENT)
Dept: SURGERY | Age: 45
End: 2020-12-08

## 2020-12-08 NOTE — TELEPHONE ENCOUNTER
Called pt - No answer - LM on VM that I did receive his [facility] extension paperwork today and will complete and send this week

## 2021-04-27 NOTE — PROGRESS NOTES
Preoperative Screening for Elective Surgery/Invasive Procedures While COVID-19 present in the community     Have you had any of the following symptoms? no  o Fever, chills  o Cough  o Shortness of breath  o Muscle aches/pain  o Diarrhea  o Abdominal pain, nausea, vomiting  o Loss or decrease in taste and / or smell   Risk of Exposure no  o Have you recently been hospitalized for COVID-19 or flu-like illness, if so when?  o Recently diagnosed with COVID-19, if so when?  o Recently tested for COVID-19, if so when?  o Have you been in close contact with a person or family member who currently has or recently had COVID-19? If yes, when and in what context?  o Do you live with anybody who in the last 14 days has had fever, chills, shortness of breath, muscle aches, flu-like illness?  o Do you have any close contacts or family members who are currently in the hospital for COVID-19 or flu-like illness? If yes, assess recent close contact with this person. Indicate if the patient has a positive screen by answering yes to one or more of the above questions. Patients who test positive or screen positive prior to surgery or on the day of surgery should be evaluated in conjunction with the surgeon/proceduralist/anesthesiologist to determine the urgency of the procedure. show

## 2023-10-10 NOTE — PROGRESS NOTES
Pre and post operative expectations and routines explained to patient, verbalized understanding.
Preoperative Screening for Elective Surgery/Invasive Procedures While COVID-19 present in the community     Have you tested positive or have been told to self-isolate for COVID-19 like symptoms within the past 28 days? NO   Do you currently have any of the following symptoms? No  o Fever >100.0 F or 99.9 F in immunocompromised patients? No  o New onset cough, shortness of breath or difficulty breathing? No  o New onset sore throat, myalgia (muscle aches and pains), headache, loss of taste/smell or diarrhea? No   Have you had a potential exposure to COVID-19 within the past 14 days by:  o Close contact with a confirmed case? No  o Close contact with a healthcare worker,  or essential infrastructure worker (grocery store, [employer], gas station, public utilities or transportation)? No  o Do you reside in a congregate setting such as; skilled nursing facility, adult home, correctional facility, homeless shelter or other institutional setting? No  o Have you had recent travel to a known COVID-19 hotspot? No    Indicate if the patient has a positive screen by answering yes to one or more of the above questions. Patients who test positive or screen positive prior to surgery or on the day of surgery should be evaluated in conjunction with the surgeon/proceduralist/anesthesiologist to determine the urgency of the procedure.
Pt advanced from lying to sitting at side of bed without adverse events: VSS/RESP WNL  abd assessment unchanged- denies pain /tolerating oral liquids without ponv    Physician  out to see pt and family and discuss exam results with family /significant other/pts family verbalized understanding of postop activity restrictions -pt/family denies additional questions    Reviewed discharge instructions with family  And pt  /verbalized understanding - all questions /concerns addressed  Educational materials given to pt and explained- all questions answered    Pain level =0
Yes

## (undated) DEVICE — Device

## (undated) DEVICE — AIRLIFE™ NASAL OXYGEN CANNULA CURVED, NONFLARED TIP, WITH 7' FEET (2.1 M) CRUSH RESISTANT TUBING, OVER-THE-EAR STYLE: Brand: AIRLIFE™

## (undated) DEVICE — ARM DRAPE

## (undated) DEVICE — TIP COVER ACCESSORY

## (undated) DEVICE — STAPLER SHEATH: Brand: ENDOWRIST

## (undated) DEVICE — PUMP SUC IRR TBNG L10FT W/ HNDPC ASSEMB STRYKEFLOW 2

## (undated) DEVICE — VESSEL SEALER EXTEND: Brand: ENDOWRIST

## (undated) DEVICE — GLOVE,SURG,SENSICARE SLT,LF,PF,7.5: Brand: MEDLINE

## (undated) DEVICE — COVER LT HNDL PLAS RIG 2 PER PK

## (undated) DEVICE — SYSTEM SMK EVAC LAP TBNG FILTER HSNG BENT STYL PNK SEE CLR

## (undated) DEVICE — REDUCER: Brand: ENDOWRIST

## (undated) DEVICE — SUTURE VCRL + SZ 3-0 L18IN ABSRB UD SH 1/2 CIR TAPERCUT NDL VCP864D

## (undated) DEVICE — CANNULA,OXY,ADULT,SUPERSOFT,W/7'TUB,SC: Brand: MEDLINE INDUSTRIES, INC.

## (undated) DEVICE — SUTURE MCRYL + SZ 4-0 L18IN ABSRB UD L19MM PS-2 3/8 CIR MCP496G

## (undated) DEVICE — STAPLER EXT 65MM S STL AUTO DISP PURSTRING

## (undated) DEVICE — RESERVOIR,SUCTION,100CC,SILICONE: Brand: MEDLINE

## (undated) DEVICE — SEAL

## (undated) DEVICE — SCISSORS SURG DIA8MM MPLR CRV ENDOWRIST

## (undated) DEVICE — MEDI-VAC NON-CONDUCTIVE SUCTION TUBING: Brand: CARDINAL HEALTH

## (undated) DEVICE — SUTURE PERMA-HAND SZ 2-0 L30IN NONABSORBABLE BLK L26MM SH K833H

## (undated) DEVICE — SUTURE PDS II SZ 0 L60IN ABSRB VLT L48MM CTX 1/2 CIR Z990G

## (undated) DEVICE — [HIGH FLOW INSUFFLATOR,  DO NOT USE IF PACKAGE IS DAMAGED,  KEEP DRY,  KEEP AWAY FROM SUNLIGHT,  PROTECT FROM HEAT AND RADIOACTIVE SOURCES.]: Brand: PNEUMOSURE

## (undated) DEVICE — CANNULA SEAL

## (undated) DEVICE — PROGRASP FORCEPS: Brand: ENDOWRIST

## (undated) DEVICE — STAPLER INT L28CM DIA29MM CLS STPL H10-2.5MM OPN LEG L5.5MM

## (undated) DEVICE — SUTURE PDS II SZ 3-0 L27IN ABSRB CLR SH L26MM 1/2 CIR TAPR Z416H

## (undated) DEVICE — DRAIN CHN 19FR L0.25IN DIA6.3MM SIL RND HUBLESS FULL FLUT

## (undated) DEVICE — COLUMN DRAPE

## (undated) DEVICE — GOWN,REINF,POLY,AURORA,XLNG/XXL,STRL: Brand: MEDLINE

## (undated) DEVICE — SUTURE PERMAHAND SZ 2-0 L18IN NONABSORBABLE BLK L26MM SH C012D

## (undated) DEVICE — SPONGE LAP W18XL18IN WHT COT 4 PLY FLD STRUNG RADPQ DISP ST

## (undated) DEVICE — STAPLER 60: Brand: SUREFORM

## (undated) DEVICE — DRESSING FOAM W10XL10CM ABSRB SFT CNFRM SAFETAC LAYR

## (undated) DEVICE — Z DISCONTINUED USE 2276105 GOWN PROTCT UNIV CHST W28IN L49IN SL 24IN BLU SPUNBOND FLM

## (undated) DEVICE — TUBING, SUCTION, 3/16" X 12', STRAIGHT: Brand: MEDLINE

## (undated) DEVICE — TROCAR: Brand: KII FIOS FIRST ENTRY

## (undated) DEVICE — ELECTRODE,ECG,STRESS,FOAM,3PK: Brand: MEDLINE

## (undated) DEVICE — LARGE NEEDLE DRIVER: Brand: ENDOWRIST

## (undated) DEVICE — PENCIL ES CRD L10FT HND SWCHING ROCK SWCH W/ EDGE COAT BLDE

## (undated) DEVICE — DRAPE,U/ SHT,SPLIT,PLAS,STERIL: Brand: MEDLINE

## (undated) DEVICE — BLADELESS OBTURATOR: Brand: WECK VISTA

## (undated) DEVICE — ENDO CARRY-ON PROCEDURE KIT INCLUDES SUCTION TUBING, LUBRICANT, GAUZE, BIOHAZARD STICKER, TRANSPORT PAD AND INTERCEPT BEDSIDE KIT.: Brand: ENDO CARRY-ON PROCEDURE KIT

## (undated) DEVICE — FENESTRATED BIPOLAR FORCEPS: Brand: ENDOWRIST

## (undated) DEVICE — SOLUTION IV IRRIG WATER 1000ML POUR BRL 2F7114

## (undated) DEVICE — MEGA SUTURECUT ND: Brand: ENDOWRIST

## (undated) DEVICE — PAD TBL OP RM TRENDELENBURG STATIC TORSO W/STRAPS

## (undated) DEVICE — ACCESS PLATFORM FOR MINIMALLY INVASIVE SURGERY.: Brand: GELPORT® LAPAROSCOPIC  SYSTEM

## (undated) DEVICE — STAPLER 60 RELOAD BLUE: Brand: SUREFORM

## (undated) DEVICE — SUTURE VCRL + SZ 0 L27IN ABSRB VLT L26MM UR-6 5/8 CIR VCP603H